# Patient Record
Sex: FEMALE | Race: WHITE | NOT HISPANIC OR LATINO | Employment: FULL TIME | URBAN - NONMETROPOLITAN AREA
[De-identification: names, ages, dates, MRNs, and addresses within clinical notes are randomized per-mention and may not be internally consistent; named-entity substitution may affect disease eponyms.]

---

## 2023-03-07 ENCOUNTER — TELEPHONE (OUTPATIENT)
Dept: PRIMARY CARE | Facility: CLINIC | Age: 48
End: 2023-03-07

## 2023-03-07 ENCOUNTER — OFFICE VISIT (OUTPATIENT)
Dept: PRIMARY CARE | Facility: CLINIC | Age: 48
End: 2023-03-07
Payer: COMMERCIAL

## 2023-03-07 VITALS
SYSTOLIC BLOOD PRESSURE: 133 MMHG | DIASTOLIC BLOOD PRESSURE: 84 MMHG | HEART RATE: 78 BPM | TEMPERATURE: 98.1 F | BODY MASS INDEX: 33.62 KG/M2 | RESPIRATION RATE: 16 BRPM | OXYGEN SATURATION: 97 % | WEIGHT: 221.1 LBS

## 2023-03-07 DIAGNOSIS — H10.30 ACUTE CONJUNCTIVITIS, UNSPECIFIED ACUTE CONJUNCTIVITIS TYPE, UNSPECIFIED LATERALITY: Primary | ICD-10-CM

## 2023-03-07 DIAGNOSIS — Z00.00 HEALTHCARE MAINTENANCE: Primary | ICD-10-CM

## 2023-03-07 DIAGNOSIS — E03.9 HYPOTHYROIDISM, UNSPECIFIED TYPE: ICD-10-CM

## 2023-03-07 PROBLEM — R00.2 PALPITATIONS: Status: ACTIVE | Noted: 2023-03-07

## 2023-03-07 PROBLEM — I10 HTN (HYPERTENSION): Status: ACTIVE | Noted: 2023-03-07

## 2023-03-07 PROBLEM — E66.811 OBESITY (BMI 30.0-34.9): Status: ACTIVE | Noted: 2023-03-07

## 2023-03-07 PROBLEM — H10.9 CONJUNCTIVITIS: Status: ACTIVE | Noted: 2023-03-07

## 2023-03-07 PROBLEM — J18.9 PNEUMONIA: Status: ACTIVE | Noted: 2023-03-07

## 2023-03-07 PROBLEM — R51.9 HEADACHE: Status: ACTIVE | Noted: 2023-03-07

## 2023-03-07 PROBLEM — F32.A DEPRESSION: Status: ACTIVE | Noted: 2023-03-07

## 2023-03-07 PROBLEM — M54.16 ACUTE LUMBAR RADICULOPATHY: Status: ACTIVE | Noted: 2023-03-07

## 2023-03-07 PROBLEM — J06.9 PROTRACTED URI: Status: ACTIVE | Noted: 2023-03-07

## 2023-03-07 PROBLEM — R07.9 CHEST PAIN: Status: ACTIVE | Noted: 2023-03-07

## 2023-03-07 PROBLEM — R60.0 LEG EDEMA: Status: ACTIVE | Noted: 2023-03-07

## 2023-03-07 PROBLEM — E78.5 HYPERLIPIDEMIA: Status: ACTIVE | Noted: 2023-03-07

## 2023-03-07 PROBLEM — E66.9 OBESITY (BMI 30.0-34.9): Status: ACTIVE | Noted: 2023-03-07

## 2023-03-07 PROBLEM — E89.2 H/O PARATHYROIDECTOMY (CMS/HCC): Status: ACTIVE | Noted: 2023-03-07

## 2023-03-07 PROBLEM — M62.830 MUSCLE SPASM OF BACK: Status: ACTIVE | Noted: 2023-03-07

## 2023-03-07 PROCEDURE — 3075F SYST BP GE 130 - 139MM HG: CPT | Performed by: FAMILY MEDICINE

## 2023-03-07 PROCEDURE — 3079F DIAST BP 80-89 MM HG: CPT | Performed by: FAMILY MEDICINE

## 2023-03-07 PROCEDURE — 99212 OFFICE O/P EST SF 10 MIN: CPT | Performed by: FAMILY MEDICINE

## 2023-03-07 RX ORDER — ETODOLAC 400 MG/1
TABLET, FILM COATED ORAL
COMMUNITY
Start: 2022-07-22 | End: 2023-07-31 | Stop reason: WASHOUT

## 2023-03-07 RX ORDER — FLUTICASONE PROPIONATE 50 MCG
1 SPRAY, SUSPENSION (ML) NASAL DAILY
COMMUNITY
Start: 2022-03-17

## 2023-03-07 RX ORDER — LEVOTHYROXINE SODIUM 50 UG/1
50 TABLET ORAL DAILY
COMMUNITY
Start: 2018-07-29 | End: 2023-10-04 | Stop reason: SDUPTHER

## 2023-03-07 RX ORDER — ALBUTEROL SULFATE 90 UG/1
2 AEROSOL, METERED RESPIRATORY (INHALATION) EVERY 4 HOURS PRN
COMMUNITY
Start: 2022-09-21

## 2023-03-07 RX ORDER — CIPROFLOXACIN HYDROCHLORIDE 3 MG/ML
1 SOLUTION/ DROPS OPHTHALMIC
Qty: 6 ML | Refills: 0 | Status: SHIPPED | OUTPATIENT
Start: 2023-03-07 | End: 2023-03-17

## 2023-03-07 RX ORDER — BUPROPION HYDROCHLORIDE 150 MG/1
1 TABLET ORAL DAILY
COMMUNITY
Start: 2022-03-31

## 2023-03-07 NOTE — PROGRESS NOTES
Subjective   Patient ID: Arcelia Kaba is a 47 y.o. female who presents for No chief complaint on file..    HPI     What concern/ problem/pain/symptom  brings you here today?      how long has pt had sxs?      describe symptoms-      how often do symptoms occur?      has pt tried anything for current symptoms, including medications (OTC or prescription)  ?        what makes symptoms worse?      has pt been seen recently for this problem ( within past 2-3 weeks) ?    if yes- where?    by who?    what treatment was provided?      Review of Systems    Objective     Physical Exam  Vitals and nursing note reviewed.   Constitutional:       Appearance: Normal appearance.   HENT:      Head: Normocephalic and atraumatic.      Right Ear: External ear normal.      Left Ear: External ear normal.      Nose: Nose normal.   Eyes:      Conjunctiva/sclera: Conjunctivae normal.   Cardiovascular:      Rate and Rhythm: Normal rate and regular rhythm.      Heart sounds: Normal heart sounds.   Pulmonary:      Effort: Pulmonary effort is normal.      Breath sounds: Normal breath sounds.   Abdominal:      General: Bowel sounds are normal.   Musculoskeletal:      Cervical back: Neck supple.   Skin:     General: Skin is warm and dry.   Neurological:      General: No focal deficit present.      Mental Status: She is alert and oriented to person, place, and time.   Psychiatric:         Mood and Affect: Mood normal.           Provider Impressions:     The number and complexity of problems addressed is considered moderate.  The amount and/or complexity of data reviewed and analyzed is considered moderate. The risk of complications and/or morbidity/mortality of patient is considered moderate. Overall, this patient encounter is considered a moderate risk visit.    What are antibiotics?  Antibiotics are medicines that help people fight infections caused by bacteria. They work by killing bacteria that are in the body. These medicines come in many  different forms, including pills, ointments, and liquids that are given by injection.    Antibiotics can do a lot of good. For people with serious bacterial infections, antibiotics can save lives. But people use them far too often, even when they're not needed. This is causing a very serious problem called antibiotic resistance. Antibiotic resistance happens when bacteria that have been exposed to an antibiotic change so that the antibiotic can no longer kill them. In those bacteria, the antibiotic has no effect. Because of this problem, doctors are having a harder and harder time treating infections. Experts worry that there will soon be infections that don't respond to any antibiotics.    When are antibiotics helpful?  Antibiotics can help people fight off infections caused by bacteria. They do not work on infections caused by viruses.    Some common bacterial infections that are treated with antibiotics include:    Strep throat    Pneumonia (an infection of the lungs)    Bladder infections    Infections you catch through sex, such as gonorrhea and chlamydia    When are antibiotics NOT helpful?    Antibiotics do not work on infections caused by viruses.    Antibiotics are not helpful for the common cold, because the common cold is caused by a virus.    Antibiotics are not helpful for the flu, because the flu is caused by a virus. (People with the flu can be treated with medicines called antiviral medicines, which are different from antibiotics.)      Even though antibiotics don't work on infections caused by viruses, people sometimes believe that they do. That's because they took antibiotics for a viral infection before and then got better. The problem is that those people would have gotten better with or without an antibiotic. When they get better with the antibiotic, they think that's what cured them, when in reality the antibiotic had nothing to do with it.    What's the harm in taking antibiotics even if they  "might not help?  There are many reasons you should not take antibiotics unless you absolutely need them:    Antibiotics cause side effects, such as nausea, vomiting, and diarrhea. They can even make it more likely that you will get a different kind of infection, such as yeast infection (if you are a woman).    Allergies to antibiotics are common. You can develop an allergy to an antibiotic, even if you have not had a problem with it before. Some allergies are just unpleasant, causing rashes and itching. But some can be very serious and even life-threatening. It is better to avoid any risk of an allergy, if the antibiotic wouldn't help you anyway.    Overuse of antibiotics leads to antibiotic resistance. Using antibiotics when they are not needed gives bacteria a chance to change, so that the antibiotics cannot hurt them later on. People who have infections caused by antibiotic-resistant bacteria often have to be treated in the hospital with many different antibiotics. People can even die from these infections, because there is no antibiotic that will cure them.    When should I take antibiotics?  You should take antibiotics only when a doctor or nurse prescribes them to you. You should never take antibiotics prescribed to someone else, and you should not take antibiotics that were prescribed to you for a previous illness. When prescribing an antibiotic, doctors and nurses have to carefully pick the right antibiotic for a particular infection. Not all antibiotics work on all bacteria.    If you do have an infection caused by a bacteria, your doctor or nurse might want to find out what that bacteria is, and which antibiotics can kill it. They do this by taking a \"culture\" of the bacteria and growing it in the lab. But it's not possible to do a culture on someone who has already started an antibiotic. So if you start an antibiotic without input from a doctor or nurse it will be impossible to know if you have taken the " "right one.    What can I do to reduce antibiotic resistance?  Here are some things that can help:    Do not pressure your doctor or nurse for antibiotics when they do not think you need them.    If you are prescribed antibiotics, finish all of the medicine and take it exactly as directed. Never skip doses or stop taking the medicine without talking to your doctor or nurse.    Do not give antibiotics that were prescribed to you to anyone else.    What if my doctor prescribes an antibiotic that did not work for me before?  If an antibiotic did not work for you before, that does not mean it will never work for you. If you have used an antibiotic before and it did not work, tell your doctor. But keep in mind that the infection you had before might not have been caused by the same bacteria that you have now. The \"best\" antibiotic is the right one for the bacteria causing the infection, not for the person with the infection.    What if I am allergic to an antibiotic?  If you had a bad reaction to an antibiotic, tell your doctor or nurse about it. But do not assume you are allergic unless your doctor or nurse tells you that you are.    Many people who think they are allergic to an antibiotic are wrong. If you get nauseous after taking an antibiotic, that does not mean you are allergic to it. Nausea is a common side effect of many antibiotics. If you are a woman and you get a yeast infection after taking an antibiotic, that does not mean you are allergic to it. Yeast infections are a common side effect of antibiotics.    Symptoms of antibiotic allergy can be mild and include a flat rash and itching. They can also be more serious and include:    Hives - Hives are raised, red patches of skin that are usually very itchy (picture 1).    Lip or tongue swelling    Trouble swallowing or breathing    Serious allergy symptoms can start right after you start taking an antibiotic if you are very sensitive. Less serious symptoms, on " the other hand, often start a day or more later.    Almost all antibiotics may cause possible side effects of allergic reaction, nausea, vomiting, diarrhea- most worrisome caused by C. diff, and secondary yeast infection.        Assessment/Plan   Start      .inb

## 2023-03-07 NOTE — TELEPHONE ENCOUNTER
Patient has upcoming appointment and needs new blood work orders added including thyroid.     Does not need call when put in, [facility]

## 2023-03-07 NOTE — PROGRESS NOTES
Subjective   Patient ID: Arcelia Kaba is a 47 y.o. female who presents for Conjunctivitis.    Conjunctivitis      What concern/ problem/pain/symptom  brings you here today?    Pink eye, rash    how long has pt had sxs?    2-3 days     describe symptoms-    Itchy eyes, watery eyes, muscle spasms- left eye, swollen eyes, rash on her right cheek, forehead    how often do symptoms occur?    Constant     has pt tried anything for current symptoms, including medications (OTC or prescription)  ?      Cold compresses, otc eye drops, non drowsy allergy pill     what makes symptoms worse?    Heat     has pt been seen recently for this problem ( within past 2-3 weeks) ?  no    if yes- where?  N/a    by who?  N/a    what treatment was provided?    N/a     Review of Systems    Objective     Patient is alert and oriented x 3 , NAD  HEAD- normocephalic and atraumatic   EYES-conjunctiva- left- red and swollen   Conj-right-red and swollen   lids - normal  EARS/NOSE- normal external exam   NECK-supple,FROM  SKIN- no abnormal skin lesions noted  NEURO- no focal deficits  PSYCH- pleasant, normal judgement and insight        Provider Impressions:     The number and complexity of problems addressed is considered moderate.  The amount and/or complexity of data reviewed and analyzed is considered moderate. The risk of complications and/or morbidity/mortality of patient is considered moderate. Overall, this patient encounter is considered a moderate risk visit.    What are antibiotics?  Antibiotics are medicines that help people fight infections caused by bacteria. They work by killing bacteria that are in the body. These medicines come in many different forms, including pills, ointments, and liquids that are given by injection.    Antibiotics can do a lot of good. For people with serious bacterial infections, antibiotics can save lives. But people use them far too often, even when they're not needed. This is causing a very serious problem  called antibiotic resistance. Antibiotic resistance happens when bacteria that have been exposed to an antibiotic change so that the antibiotic can no longer kill them. In those bacteria, the antibiotic has no effect. Because of this problem, doctors are having a harder and harder time treating infections. Experts worry that there will soon be infections that don't respond to any antibiotics.    When are antibiotics helpful?  Antibiotics can help people fight off infections caused by bacteria. They do not work on infections caused by viruses.    Some common bacterial infections that are treated with antibiotics include:    Strep throat    Pneumonia (an infection of the lungs)    Bladder infections    Infections you catch through sex, such as gonorrhea and chlamydia    When are antibiotics NOT helpful?    Antibiotics do not work on infections caused by viruses.    Antibiotics are not helpful for the common cold, because the common cold is caused by a virus.    Antibiotics are not helpful for the flu, because the flu is caused by a virus. (People with the flu can be treated with medicines called antiviral medicines, which are different from antibiotics.)      Even though antibiotics don't work on infections caused by viruses, people sometimes believe that they do. That's because they took antibiotics for a viral infection before and then got better. The problem is that those people would have gotten better with or without an antibiotic. When they get better with the antibiotic, they think that's what cured them, when in reality the antibiotic had nothing to do with it.    What's the harm in taking antibiotics even if they might not help?  There are many reasons you should not take antibiotics unless you absolutely need them:    Antibiotics cause side effects, such as nausea, vomiting, and diarrhea. They can even make it more likely that you will get a different kind of infection, such as yeast infection (if you are a  "woman).    Allergies to antibiotics are common. You can develop an allergy to an antibiotic, even if you have not had a problem with it before. Some allergies are just unpleasant, causing rashes and itching. But some can be very serious and even life-threatening. It is better to avoid any risk of an allergy, if the antibiotic wouldn't help you anyway.    Overuse of antibiotics leads to antibiotic resistance. Using antibiotics when they are not needed gives bacteria a chance to change, so that the antibiotics cannot hurt them later on. People who have infections caused by antibiotic-resistant bacteria often have to be treated in the hospital with many different antibiotics. People can even die from these infections, because there is no antibiotic that will cure them.    When should I take antibiotics?  You should take antibiotics only when a doctor or nurse prescribes them to you. You should never take antibiotics prescribed to someone else, and you should not take antibiotics that were prescribed to you for a previous illness. When prescribing an antibiotic, doctors and nurses have to carefully pick the right antibiotic for a particular infection. Not all antibiotics work on all bacteria.    If you do have an infection caused by a bacteria, your doctor or nurse might want to find out what that bacteria is, and which antibiotics can kill it. They do this by taking a \"culture\" of the bacteria and growing it in the lab. But it's not possible to do a culture on someone who has already started an antibiotic. So if you start an antibiotic without input from a doctor or nurse it will be impossible to know if you have taken the right one.    What can I do to reduce antibiotic resistance?  Here are some things that can help:    Do not pressure your doctor or nurse for antibiotics when they do not think you need them.    If you are prescribed antibiotics, finish all of the medicine and take it exactly as directed. Never skip " "doses or stop taking the medicine without talking to your doctor or nurse.    Do not give antibiotics that were prescribed to you to anyone else.    What if my doctor prescribes an antibiotic that did not work for me before?  If an antibiotic did not work for you before, that does not mean it will never work for you. If you have used an antibiotic before and it did not work, tell your doctor. But keep in mind that the infection you had before might not have been caused by the same bacteria that you have now. The \"best\" antibiotic is the right one for the bacteria causing the infection, not for the person with the infection.    What if I am allergic to an antibiotic?  If you had a bad reaction to an antibiotic, tell your doctor or nurse about it. But do not assume you are allergic unless your doctor or nurse tells you that you are.    Many people who think they are allergic to an antibiotic are wrong. If you get nauseous after taking an antibiotic, that does not mean you are allergic to it. Nausea is a common side effect of many antibiotics. If you are a woman and you get a yeast infection after taking an antibiotic, that does not mean you are allergic to it. Yeast infections are a common side effect of antibiotics.    Symptoms of antibiotic allergy can be mild and include a flat rash and itching. They can also be more serious and include:    Hives - Hives are raised, red patches of skin that are usually very itchy (picture 1).    Lip or tongue swelling    Trouble swallowing or breathing    Serious allergy symptoms can start right after you start taking an antibiotic if you are very sensitive. Less serious symptoms, on the other hand, often start a day or more later.    Almost all antibiotics may cause possible side effects of allergic reaction, nausea, vomiting, diarrhea- most worrisome caused by C. diff, and secondary yeast infection.        Assessment/Plan       start eye drops today      remove contacts if you " wear them      wash pillowcases daily      wash hands often      avoid rubbing your eyes      call if symptoms worsen or if no better

## 2023-06-19 ENCOUNTER — APPOINTMENT (OUTPATIENT)
Dept: PRIMARY CARE | Facility: CLINIC | Age: 48
End: 2023-06-19

## 2023-07-31 ENCOUNTER — OFFICE VISIT (OUTPATIENT)
Dept: PRIMARY CARE | Facility: CLINIC | Age: 48
End: 2023-07-31
Payer: COMMERCIAL

## 2023-07-31 VITALS
HEART RATE: 69 BPM | OXYGEN SATURATION: 96 % | BODY MASS INDEX: 33.13 KG/M2 | DIASTOLIC BLOOD PRESSURE: 87 MMHG | RESPIRATION RATE: 16 BRPM | HEIGHT: 69 IN | SYSTOLIC BLOOD PRESSURE: 131 MMHG | TEMPERATURE: 97.6 F

## 2023-07-31 DIAGNOSIS — I10 HYPERTENSION, UNSPECIFIED TYPE: ICD-10-CM

## 2023-07-31 DIAGNOSIS — Z12.31 ENCOUNTER FOR SCREENING MAMMOGRAM FOR MALIGNANT NEOPLASM OF BREAST: ICD-10-CM

## 2023-07-31 DIAGNOSIS — E66.9 CLASS 1 OBESITY WITH BODY MASS INDEX (BMI) OF 33.0 TO 33.9 IN ADULT, UNSPECIFIED OBESITY TYPE, UNSPECIFIED WHETHER SERIOUS COMORBIDITY PRESENT: ICD-10-CM

## 2023-07-31 DIAGNOSIS — F32.A DEPRESSION, UNSPECIFIED DEPRESSION TYPE: ICD-10-CM

## 2023-07-31 DIAGNOSIS — R53.83 MALAISE AND FATIGUE: ICD-10-CM

## 2023-07-31 DIAGNOSIS — R23.2 FLUSHING: ICD-10-CM

## 2023-07-31 DIAGNOSIS — E03.9 HYPOTHYROIDISM, UNSPECIFIED TYPE: ICD-10-CM

## 2023-07-31 DIAGNOSIS — Z00.00 HEALTHCARE MAINTENANCE: ICD-10-CM

## 2023-07-31 DIAGNOSIS — E89.2 H/O PARATHYROIDECTOMY (CMS/HCC): ICD-10-CM

## 2023-07-31 DIAGNOSIS — J30.9 ALLERGIC RHINITIS, UNSPECIFIED SEASONALITY, UNSPECIFIED TRIGGER: ICD-10-CM

## 2023-07-31 DIAGNOSIS — E78.5 HYPERLIPIDEMIA, UNSPECIFIED HYPERLIPIDEMIA TYPE: ICD-10-CM

## 2023-07-31 DIAGNOSIS — R53.81 MALAISE AND FATIGUE: ICD-10-CM

## 2023-07-31 DIAGNOSIS — M25.40 JOINT SWELLING: Primary | ICD-10-CM

## 2023-07-31 DIAGNOSIS — Z12.11 COLON CANCER SCREENING: ICD-10-CM

## 2023-07-31 PROBLEM — H10.9 CONJUNCTIVITIS: Status: RESOLVED | Noted: 2023-03-07 | Resolved: 2023-07-31

## 2023-07-31 PROBLEM — R07.9 CHEST PAIN: Status: RESOLVED | Noted: 2023-03-07 | Resolved: 2023-07-31

## 2023-07-31 PROBLEM — J18.9 PNEUMONIA: Status: RESOLVED | Noted: 2023-03-07 | Resolved: 2023-07-31

## 2023-07-31 PROBLEM — R51.9 HEADACHE: Status: RESOLVED | Noted: 2023-03-07 | Resolved: 2023-07-31

## 2023-07-31 PROBLEM — J06.9 PROTRACTED URI: Status: RESOLVED | Noted: 2023-03-07 | Resolved: 2023-07-31

## 2023-07-31 PROCEDURE — 3008F BODY MASS INDEX DOCD: CPT | Performed by: FAMILY MEDICINE

## 2023-07-31 PROCEDURE — 3079F DIAST BP 80-89 MM HG: CPT | Performed by: FAMILY MEDICINE

## 2023-07-31 PROCEDURE — 3075F SYST BP GE 130 - 139MM HG: CPT | Performed by: FAMILY MEDICINE

## 2023-07-31 PROCEDURE — 1036F TOBACCO NON-USER: CPT | Performed by: FAMILY MEDICINE

## 2023-07-31 PROCEDURE — 99396 PREV VISIT EST AGE 40-64: CPT | Performed by: FAMILY MEDICINE

## 2023-07-31 PROCEDURE — 99214 OFFICE O/P EST MOD 30 MIN: CPT | Performed by: FAMILY MEDICINE

## 2023-07-31 NOTE — ASSESSMENT & PLAN NOTE
If secretions are THICK:  Drink at least 6-8 glasses of water daily to thin secretions.  Mucinex can be used if secretions remain thick.    If secretions are THIN, watery, and abundant:  Antihistamine such as loratadine (claritin) can help dry up a drippy nose.      Supplementation of 75 mg of zinc at first sign of cold-like symptoms has been shown to improve symptoms.     A teaspoon of honey every 4 hours as needed for cough has been shown to reduce cough as well or better than over-the-counter cough suppressants. Cough drops can also be helpful.     Gargling with warm salt water can help clear post nasal drip and soothe a sore throat.    Throat lozenges can also be helpful.      Fever or aches can be helped by taking acetaminophen (Tylenol) every four hours as needed, or ibuprofen (Motrin, Advil) or naproxen (Aleve) as directed if you are able.

## 2023-07-31 NOTE — ASSESSMENT & PLAN NOTE
Lifestyle managed  FMHx of CAD on father's side.  3/2022 TC/HDL ratio 7.1 (HDL low, TRIG in 800s).  Goal TC/HDL ratio 3.4 or less, LDL 99 or less,  or less, and TRIG 150 or less.   Repeat lipid panel ordered today.  Will further address at next OV.

## 2023-07-31 NOTE — ASSESSMENT & PLAN NOTE
BP is 131/87 in office today. Mildly elevated.  Goal BP is 130/80 or less, ideally 120/80 or less.   Will further address at next OV  Diet and exercise recommendations revisited.

## 2023-07-31 NOTE — ASSESSMENT & PLAN NOTE
Vaccines and screenings reviewed.  Questionnaires completed.  Health and wellness topics reviewed.  Diet and exercise recommendations revisited.  Routine blood work ordered today.     TDAP booster recommended, can get done at pharmacy.    Screening mammogram ordered today.  GI referral for screening colonoscopy ordered today.    LIFESTYLE MEASURES  -make sure you are avoiding refined carbs such as breads, pasta, cereal, candy, soda,  nutrition bars, granola, chips, and sugar sweetened beverages.      -eat 5- 7 servings daily of veggies,  healthy protein such as chicken, fish,  beans, and eggs, and include healthy fats in your diet such as seeds, nuts, olive oil, avocados, and salmon.   -exercise 4 - 6 days per week as you are able, 150 minutes total weekly divided up is recommended.  -Vitamin D is recommended at 1000 - 5000 IU international units daily.   -Always wear sunscreen when you have sun exposure.  -64 oz of water is recommended daily.  -Dental visits recommended every 6 months.  -Eye exam recommended every 2 years, for those with vision problems every year.

## 2023-07-31 NOTE — ASSESSMENT & PLAN NOTE
FMHx of significant thyroid disease (multiple family members with Hashimoto's, Graves)  CURRENT DOSE: Levothyroxine 50 mcg daily  3/2022 TSH normal, repeat ordered today.  Will address dose changes if needed at follow-up.

## 2023-07-31 NOTE — PROGRESS NOTES
"Subjective   Patient ID: Arcelia Kaba is a 48 y.o. female who presents for Annual Exam (Pt is here for annual physical exam- ABN given to pt and signed, pt verbalized understanding. //Pt is also here for a 6 month follow up- Pt would like to discuss her thyroid medication- states that she thinks that she may be pre menopausal or the med is not working).    HPI     WELLNESS VISIT:    TDAP: none found  SHINGRIX: N/A  PNEUMOVAX: N/A  PAP: s/p partial hysterectomy related to menorrhea/no malignancy  MAMMO: none found  CSCOPE: none found  DEXA: N/A  HEP C SCREEN: none found  CACS: none found  LIPID: 3/2022 TC/HDL ratio 7.1, HDL 23.6, TRIG 829    She takes she feels she is going into menopause or that her thyroid medication may not be working properly. Reports fatigue and just not feeling normal. Joint swelling in knees, ankles, and feet. Notices the ankle and feet swelling. Reports flushing, has had partial hysterectomy so no periods. Notes cervical lymphadenopathy x 6 months, states intermittent about every other day. No other swollen glands in arms, legs, groin, etc. Does not feel \"unwell\", just tired. Feels she gets sick often, has kids, youngest aged 4, at home. She gets sick every 60 days, her kids are not sick now and she feels unwell.    Other than thyroid disease, no family history of autoimmune diseases.    Dental visits up to date.    Breast cancer screening: Denies family history.   Denies lumps/bumps, skin changes, nipple retraction, or nipple drainage.    Cervical cancer screening: s/p partial hysterectomy related to menorrhea/no malignancy  Denies family history.   Denies pelvic pain, vaginal discharge, or vaginal bleeding.    Colon cancer screening: Denies family history.   Reports constipation, no new, working on increasing veggies.  Denies melena, hematochezia, constipation, bloating, change in bowel habits.    ROUTINE VISIT:  CHRONIC CONDITIONS:   -ELEVATED LFTs  3/2022 CMP with elevated AST (66), mild " "but new for patient.  Remaining CMP WNL.  Repeat blood work ordered previously, not completed at this time.     -HTN  Started on HCTZ 6/2022, no longer on med list 7/2023  Unable to tolerate the Amlodipine 2.5 mg due to edema.    Patient is taking blood pressure outside of office and reports good control.  Patient denies chest pain, shortness of breath with exertion, palpitations, lower extremity edema, headache, or dizziness.      -MOOD  Taking Bupropion 300 mg daily  Venlafaxine added on 2/2022, however stopped in 3/2022 due to possible side effects.     -HYPOTHYROIDISM  FMHx of significant thyroid disease (multiple family members with Hashimoto's, Graves)  CURRENT DOSE: Levothyroxine 50 mcg daily  3/2022 TSH normal.    Medication is being taken as directed and is well-tolerated.   Patient denies heat or cold intolerance, diarrhea or constipation, coarsening of hair, and palpitations.     -HLD  Lifestyle managed  FMHx of CAD on father's side.  3/2022 TC/HDL ratio 7.1 (HDL low, TRIG in 800s).  No CACS in our records.    Patient denies any facial droop, sudden vision loss, weakness or numbness on one side of body.   Patient denies chest pain, shortness of breath with exertion, palpitations, or symptoms of claudication.     -HX of PRIMARY HYPERPARATHYROIDISM  Episode of severe hypercalcemia with Ca >14 and PTH level >400.   She was treated with cellmate and her calcium levels normalized down to 11.4.   The effective the Zometa should last about 3-4 weeks.   Sestamibi scan US performed by Dr. Daly highly consistent with a right inferior parathyroid gland.  S/p parathyroidectomy February 2021, with intraoperative PTH levels normalizing.    Review of Systems   All other systems reviewed and are negative.      Objective   /87 (BP Location: Right arm, Patient Position: Sitting, BP Cuff Size: Large adult)   Pulse 69   Temp 36.4 °C (97.6 °F) (Temporal)   Resp 16   Ht 1.74 m (5' 8.5\")   SpO2 96%   BMI 33.13 " kg/m²     Physical Exam  Vitals and nursing note reviewed.   Constitutional:       General: She is not in acute distress.     Appearance: Normal appearance. She is not toxic-appearing.   HENT:      Head: Normocephalic and atraumatic.      Right Ear: Tympanic membrane normal.      Left Ear: Tympanic membrane normal.      Nose: Rhinorrhea present.      Right Turbinates: Swollen.      Left Turbinates: Swollen.      Mouth/Throat:      Lips: Pink.   Eyes:      Extraocular Movements: Extraocular movements intact.      Pupils: Pupils are equal, round, and reactive to light.   Neck:      Thyroid: No thyromegaly.      Vascular: No hepatojugular reflux or JVD.   Cardiovascular:      Rate and Rhythm: Normal rate and regular rhythm.      Heart sounds: No murmur heard.     No friction rub. No gallop.   Pulmonary:      Effort: Pulmonary effort is normal.      Breath sounds: Normal breath sounds. No wheezing, rhonchi or rales.   Abdominal:      General: Bowel sounds are normal. There is no distension.      Palpations: Abdomen is soft. There is no mass.      Tenderness: There is no abdominal tenderness. There is no guarding.   Musculoskeletal:         General: Normal range of motion.      Comments: Mild edema dorsum bilateral feet, left greater than R  Mild erythema across mid food, left.  Scales and plaque soles of R foot.  No pitting edema.   Lymphadenopathy:      Cervical: No cervical adenopathy.   Skin:     General: Skin is warm and dry.   Neurological:      General: No focal deficit present.      Mental Status: She is alert and oriented to person, place, and time.      Gait: Gait normal.   Psychiatric:         Mood and Affect: Mood normal.         Behavior: Behavior normal.         Assessment/Plan   Problem List Items Addressed This Visit       Depression     Continue Bupropion 300 mg daily.  Can further discuss at next OV if needed.         H/O parathyroidectomy (CMS/[facility])     S/p parathyroidectomy February 2021, with  intraoperative PTH levels normalizing.         HTN (hypertension)     BP is 131/87 in office today. Mildly elevated.  Goal BP is 130/80 or less, ideally 120/80 or less.   Will further address at next OV  Diet and exercise recommendations revisited.         Hyperlipidemia     Lifestyle managed  FMHx of CAD on father's side.  3/2022 TC/HDL ratio 7.1 (HDL low, TRIG in 800s).  Goal TC/HDL ratio 3.4 or less, LDL 99 or less,  or less, and TRIG 150 or less.   Repeat lipid panel ordered today.  Will further address at next OV.         Hypothyroidism     FMHx of significant thyroid disease (multiple family members with Hashimoto's, Graves)  CURRENT DOSE: Levothyroxine 50 mcg daily  3/2022 TSH normal, repeat ordered today.  Will address dose changes if needed at follow-up.         Relevant Orders    TSH with reflex to Free T4 if abnormal    Thyrotropin Receptor Antibody    Thyroid Peroxidase (TPO) Antibody    Anti-Thyroglobulin Antibody    Triiodothyronine, Free    Healthcare maintenance     Vaccines and screenings reviewed.  Questionnaires completed.  Health and wellness topics reviewed.  Diet and exercise recommendations revisited.  Routine blood work ordered today.     TDAP booster recommended, can get done at pharmacy.    Screening mammogram ordered today.  GI referral for screening colonoscopy ordered today.    LIFESTYLE MEASURES  -make sure you are avoiding refined carbs such as breads, pasta, cereal, candy, soda,  nutrition bars, granola, chips, and sugar sweetened beverages.      -eat 5- 7 servings daily of veggies,  healthy protein such as chicken, fish,  beans, and eggs, and include healthy fats in your diet such as seeds, nuts, olive oil, avocados, and salmon.   -exercise 4 - 6 days per week as you are able, 150 minutes total weekly divided up is recommended.  -Vitamin D is recommended at 1000 - 5000 IU international units daily.   -Always wear sunscreen when you have sun exposure.  -64 oz of water is  recommended daily.  -Dental visits recommended every 6 months.  -Eye exam recommended every 2 years, for those with vision problems every year.           Relevant Orders    CBC    Comprehensive Metabolic Panel    Lipid Panel    Allergic rhinitis     If secretions are THICK:  Drink at least 6-8 glasses of water daily to thin secretions.  Mucinex can be used if secretions remain thick.    If secretions are THIN, watery, and abundant:  Antihistamine such as loratadine (claritin) can help dry up a drippy nose.      Supplementation of 75 mg of zinc at first sign of cold-like symptoms has been shown to improve symptoms.     A teaspoon of honey every 4 hours as needed for cough has been shown to reduce cough as well or better than over-the-counter cough suppressants. Cough drops can also be helpful.     Gargling with warm salt water can help clear post nasal drip and soothe a sore throat.    Throat lozenges can also be helpful.      Fever or aches can be helped by taking acetaminophen (Tylenol) every four hours as needed, or ibuprofen (Motrin, Advil) or naproxen (Aleve) as directed if you are able.           Other Visit Diagnoses       Joint swelling    -  Primary    Relevant Orders    LYNDA with Reflex to LU    C-Reactive Protein    Sedimentation Rate    Citrulline Antibody, IgG    Creatine Kinase    Rheumatoid Factor    Class 1 obesity with body mass index (BMI) of 33.0 to 33.9 in adult, unspecified obesity type, unspecified whether serious comorbidity present        Malaise and fatigue        Relevant Orders    LYNDA with Reflex to LU    C-Reactive Protein    Sedimentation Rate    Citrulline Antibody, IgG    Creatine Kinase    Rheumatoid Factor    FSH & LH    Flushing        Relevant Orders    FSH & LH    Encounter for screening mammogram for malignant neoplasm of breast        Relevant Orders    BI mammo bilateral screening tomosynthesis    Colon cancer screening        Relevant Orders    Referral to Gastroenterology             Follow-up in 4-8 weeks for recheck above/lab review.  Call for sooner follow-up if needed.     Time Spent  Prep time on day of patient encounter: 5 minutes  Time spent directly with patient, family or caregiver: 30 minutes  Additional Time Spent on Patient Care Activities: 0 minutes  Documentation Time: 7 minutes  Other Time Spent: 0 minutes  Total: 42 minutes      Scribe Attestation  By signing my name below, IShameka, Angel   attest that this documentation has been prepared under the direction and in the presence of Donna Mello DO.

## 2023-10-04 ENCOUNTER — TELEMEDICINE (OUTPATIENT)
Dept: PRIMARY CARE | Facility: CLINIC | Age: 48
End: 2023-10-04
Payer: COMMERCIAL

## 2023-10-04 DIAGNOSIS — R53.81 MALAISE AND FATIGUE: ICD-10-CM

## 2023-10-04 DIAGNOSIS — R53.83 MALAISE AND FATIGUE: ICD-10-CM

## 2023-10-04 DIAGNOSIS — R23.2 FLUSHING: ICD-10-CM

## 2023-10-04 DIAGNOSIS — E03.9 HYPOTHYROIDISM, UNSPECIFIED TYPE: Primary | ICD-10-CM

## 2023-10-04 DIAGNOSIS — M25.40 JOINT SWELLING: ICD-10-CM

## 2023-10-04 PROCEDURE — 99213 OFFICE O/P EST LOW 20 MIN: CPT | Performed by: FAMILY MEDICINE

## 2023-10-04 RX ORDER — LEVOTHYROXINE SODIUM 50 UG/1
50 TABLET ORAL DAILY
Qty: 90 TABLET | Refills: 1 | Status: SHIPPED | OUTPATIENT
Start: 2023-10-04 | End: 2024-01-11

## 2023-10-04 NOTE — PROGRESS NOTES
Virtual or Telephone Consent    An interactive audio and video telecommunication system which permits real time communications between the patient (at the originating site) and provider (at the distant site) was utilized to provide this telehealth service.   Verbal consent was requested and obtained from Arcelia Kaba on this date, 10/04/23 for a telehealth visit.     Subjective   Patient ID: Arcelia Kaba is a 48 y.o. female who presents for No chief complaint on file..    HPI     VIRTUAL VISIT    Patient here for recheck and lab review.    Patient seen on 7/31/2023 for CPE, she also endorsed some joint pain/swelling, malaise, fatigue, and flushing so autoimmune and hormone lab work-up was initiated.    Labs not done prior to visit today.  Was previously on her 's insurance but they suddenly closed and this insurance was cancelled.  They had to wait to get on insurance through her workplace which has since been confirmed.    States she would like to talk about her thyroid dosing.    FMHx of significant thyroid disease (multiple family members with Hashimoto's, Graves)  CURRENT DOSE: Levothyroxine 50 mcg daily  3/2022 TSH normal.    States feels same at last visit when labs were ordered.  + fatigue, + feeling unwell, + edema  + joint swelling in knees, ankles, and feet.   + flushing, has had partial hysterectomy (around 2014) so no periods.  She had really bad periods prior to the partial hysterectomy.    Drinking lots of water.  Trying not to drink caffeine, she is doing mushroom coffee.  She is taking magnesium and vitamin D supplements for sleep.      Review of Systems   All other systems reviewed and are negative.    Objective     Physical Exam  Nursing note reviewed.       Visit conducted via telehealth in light of COVID-19 pandemic.    Video used     Gen: patient is alert and oriented x 3  pleasant, and in no apparent distress.  Patient appears well, no cough, no dyspnea/tachypnea observed on video.    Psychiatric: Patient has good eye contact. Mood and affect are appropriate.     Assessment/Plan   Diagnoses and all orders for this visit:  Hypothyroidism, unspecified type  Joint swelling  Malaise and fatigue  Flushing    Continue with Synthroid as presently subscribed.  Will notify if changes in dose is indicated once labs are completed.    Autoimmune/hormone work-up previously ordered, patient had some insurance issues in interim but these are resolved and she will complete these labs fasting first thing in the morning at her convenience as the orders are still good.    Lifestyle measures reviewed today:  -consider doing trial of elimination diet, most commonly people eliminate gluten, diary, and sugars. I recommend doing one at the time to minimize variables.  -make sure you are avoiding refined carbs such as breads, pasta, cereal, candy, soda,  nutrition bars, granola, chips, and sugar sweetened beverages.      -eat 5- 7 servings daily of veggies,  healthy protein such as chicken, fish,  beans, and eggs, and include healthy fats in your diet such as seeds, nuts, olive oil, avocados, and salmon.   -exercise 4 - 6 days per week as you are able, 150 minutes total weekly divided up is recommended.  -Vitamin D is recommended at 1000 - 5000 IU international units daily.    We briefly discussed functional medicine evaluation today, can further address upon labs resulting/follow-up.    Patient to complete labs then will come back for recheck above + lab review.  Call for sooner follow-up if needed.     Scribe Attestation  By signing my name below, IShameka Scribe   attest that this documentation has been prepared under the direction and in the presence of Donna Mello DO.

## 2023-10-30 ENCOUNTER — OFFICE VISIT (OUTPATIENT)
Dept: PRIMARY CARE | Facility: CLINIC | Age: 48
End: 2023-10-30
Payer: COMMERCIAL

## 2023-10-30 VITALS
OXYGEN SATURATION: 97 % | SYSTOLIC BLOOD PRESSURE: 146 MMHG | BODY MASS INDEX: 34.73 KG/M2 | HEART RATE: 87 BPM | TEMPERATURE: 97.6 F | DIASTOLIC BLOOD PRESSURE: 91 MMHG | WEIGHT: 231.8 LBS

## 2023-10-30 DIAGNOSIS — B34.9 VIRAL SYNDROME: Primary | ICD-10-CM

## 2023-10-30 DIAGNOSIS — J02.9 PHARYNGITIS, UNSPECIFIED ETIOLOGY: ICD-10-CM

## 2023-10-30 PROCEDURE — 3008F BODY MASS INDEX DOCD: CPT | Performed by: FAMILY MEDICINE

## 2023-10-30 PROCEDURE — 3077F SYST BP >= 140 MM HG: CPT | Performed by: FAMILY MEDICINE

## 2023-10-30 PROCEDURE — 99213 OFFICE O/P EST LOW 20 MIN: CPT | Performed by: FAMILY MEDICINE

## 2023-10-30 PROCEDURE — 1036F TOBACCO NON-USER: CPT | Performed by: FAMILY MEDICINE

## 2023-10-30 PROCEDURE — 3080F DIAST BP >= 90 MM HG: CPT | Performed by: FAMILY MEDICINE

## 2023-10-30 RX ORDER — PREDNISONE 20 MG/1
TABLET ORAL
Qty: 4 TABLET | Refills: 0 | Status: SHIPPED | OUTPATIENT
Start: 2023-10-30 | End: 2024-01-11 | Stop reason: WASHOUT

## 2023-10-30 RX ORDER — PREDNISONE 20 MG/1
TABLET ORAL
Qty: 4 TABLET | Refills: 0 | Status: SHIPPED | OUTPATIENT
Start: 2023-10-30 | End: 2023-10-30 | Stop reason: SDUPTHER

## 2023-10-30 NOTE — PROGRESS NOTES
Subjective   Patient ID: Arcelia Kaba is a 48 y.o. female who presents for Sinusitis (Pt presents for possible sinus infection- sore throat, fever, chills, body aches, hurts to talk,  hurts to swallow, started in her ears, swollen glands, this has been going on for about 4 days, states that 600mg ibuprofen is the only thing that has worked to help with her pain ).    HPI     Patient here for evaluation of upper respiratory symptoms.  Patient reports symptoms ongoing for about 4-5 days now and progressing.    She states it started in her ears, was also having some sneezing, fatigue and swollen glands at that time.  She is now having sore throat that is painful to phonate and swallow.  She continues with cervical lymphadenopathy, states largest she ever felt.   She also endorses fevers, chills, and myalgias.    No colored secretions.  No cough or wheezing.  No SOB or difficulty handling secretions.  No N/V/D or abdominal pain.    No prior history of mono.    No prior covid or flu testing.    Home treatments include:  -Ibuprofen 600 mg, states the only thing that has helped with the pain, can hardly talk without it.  -She also used iced roller helped some but not relieving.       Review of Systems   All other systems reviewed and are negative.      Objective   BP (!) 159/96 (BP Location: Right arm, Patient Position: Sitting, BP Cuff Size: Large adult)   Pulse 87   Temp 36.4 °C (97.6 °F) (Temporal)   Wt 105 kg (231 lb 12.8 oz)   SpO2 97%   BMI 34.73 kg/m²     Physical Exam  Vitals and nursing note reviewed.   Constitutional:       General: She is not in acute distress.     Appearance: Normal appearance. She is not toxic-appearing.   HENT:      Head: Normocephalic and atraumatic.      Right Ear: A middle ear effusion (clear) is present.      Left Ear: A middle ear effusion (clear) is present. Tympanic membrane is retracted.      Nose: Rhinorrhea (clear) present.      Right Turbinates: Swollen (mildly erythematous).       Left Turbinates: Swollen (mildly erythematous).      Mouth/Throat:      Lips: Pink.      Pharynx: Pharyngeal swelling and posterior oropharyngeal erythema present. No oropharyngeal exudate.      Tonsils: No tonsillar exudate.      Comments:   Throat swollen but not obstructed, no difficulty handling secretions.  Cardiovascular:      Rate and Rhythm: Normal rate and regular rhythm.      Heart sounds: No murmur heard.     No friction rub. No gallop.   Pulmonary:      Effort: Pulmonary effort is normal.      Breath sounds: Normal breath sounds. No wheezing, rhonchi or rales.   Abdominal:      Palpations: There is no hepatomegaly, splenomegaly or mass.      Tenderness: There is no abdominal tenderness. There is no guarding or rebound.   Neurological:      General: No focal deficit present.      Mental Status: She is alert and oriented to person, place, and time.   Psychiatric:         Mood and Affect: Mood normal.         Behavior: Behavior normal.         Assessment/Plan   Diagnoses and all orders for this visit:  Viral syndrome  Pharyngitis, unspecified etiology  -     predniSONE (Deltasone) 20 mg tablet; 2 tabs daily x 2 days for sore throat; difficulty swallowing  - do not take within 24 hours of ibuprofen/naproxen    You have been diagnosed with URI (viral) vs other viral infection therefore treatment is management of symptoms. Treatment with an antibiotic for typical URI does not help, and can cause harm from side effects of medications and bacterial resistance. Will treat symptomatically as noted below.    Flu and covid testing deferred, would not impact treatment as symptoms ongoing for 4-5 days.    Due to degree of inflammation/pharyngitis, a prescription for short course high dose of prednisone has been printed in case needed. If your inflammation/symptoms have not improved with after a few days, you may take the prednisone as directed. Side effects can include insomnia, irritability, hunger, weight gain,  flushing, elevated blood sugars, suppressed immune system. DO NOT USE THE PREDNISONE UNLESS NECESSARY, do not take within 24 hours of last NSAID use.    If secretions are THICK:  Drink at least 6-8 glasses of water daily to thin secretions.  Mucinex can be used if secretions remain thick.    If secretions are THIN, watery, and abundant:  Antihistamine such as loratadine (claritin) can help dry up a drippy nose.      Supplementation of 75 mg of zinc at first sign of cold-like symptoms has been shown to improve symptoms.     A teaspoon of honey every 4 hours as needed for cough has been shown to reduce cough as well or better than over-the-counter cough suppressants. Cough drops can also be helpful.     Gargling with warm salt water can help clear post nasal drip and soothe a sore throat.    Throat lozenges can also be helpful.      Fever or aches can be helped by taking acetaminophen (Tylenol) every four hours as needed, or ibuprofen (Motrin, Advil) or naproxen (Aleve) as directed if you are able.    Other options to help open up nasal passages and Eustachian tubes can include non-medicine intervention such as steam, essential oils such as Eucalyptus, peppermint, rosemary added to a diffuser or humidifier.    Mobilizing fluid and helping with congestion through repetitive exercise such as rebounding on a mini- trampoline, jumping jacks, or running may all help.     The Deb technique can be used, which is a Osteopathic manual medicine technique to help with ear fullness.   Perform by pulling up and back on your ear- use a firm steady force and pull your ear up and back, until you feel a deep tug at the base of your ear. At the same time, use your other hand to pull the angle of your jaw (the same side of the face as the ear you are tugging) in the opposite direction - pull and massage the jaw area forward and down, away from your ear, thus stretching out the deep tissues that surround the Eustachian tube.          If you should develop a fever and worsening cough or nasal secretions with consistent yellow or green phlegm, please call for next steps/antibiotic if indicated.        Follow-up with me as previously scheduled for routine care.  Call for sooner follow-up if needed.     Scribe Attestation  By signing my name below, Shameka CHANEL Scribe   attest that this documentation has been prepared under the direction and in the presence of Donna Mello DO.

## 2024-01-05 ENCOUNTER — LAB (OUTPATIENT)
Dept: LAB | Facility: LAB | Age: 49
End: 2024-01-05
Payer: COMMERCIAL

## 2024-01-05 DIAGNOSIS — R53.81 MALAISE AND FATIGUE: ICD-10-CM

## 2024-01-05 DIAGNOSIS — M25.40 JOINT SWELLING: ICD-10-CM

## 2024-01-05 DIAGNOSIS — E03.9 HYPOTHYROIDISM, UNSPECIFIED TYPE: ICD-10-CM

## 2024-01-05 DIAGNOSIS — Z00.00 HEALTHCARE MAINTENANCE: ICD-10-CM

## 2024-01-05 DIAGNOSIS — R23.2 FLUSHING: ICD-10-CM

## 2024-01-05 DIAGNOSIS — R53.83 MALAISE AND FATIGUE: ICD-10-CM

## 2024-01-05 PROCEDURE — 85027 COMPLETE CBC AUTOMATED: CPT

## 2024-01-05 PROCEDURE — 84481 FREE ASSAY (FT-3): CPT

## 2024-01-05 PROCEDURE — 86376 MICROSOMAL ANTIBODY EACH: CPT

## 2024-01-05 PROCEDURE — 85652 RBC SED RATE AUTOMATED: CPT

## 2024-01-05 PROCEDURE — 86800 THYROGLOBULIN ANTIBODY: CPT

## 2024-01-05 PROCEDURE — 84443 ASSAY THYROID STIM HORMONE: CPT

## 2024-01-05 PROCEDURE — 82550 ASSAY OF CK (CPK): CPT

## 2024-01-05 PROCEDURE — 83001 ASSAY OF GONADOTROPIN (FSH): CPT

## 2024-01-05 PROCEDURE — 86140 C-REACTIVE PROTEIN: CPT

## 2024-01-05 PROCEDURE — 86200 CCP ANTIBODY: CPT

## 2024-01-05 PROCEDURE — 83519 RIA NONANTIBODY: CPT

## 2024-01-05 PROCEDURE — 86235 NUCLEAR ANTIGEN ANTIBODY: CPT

## 2024-01-05 PROCEDURE — 80061 LIPID PANEL: CPT

## 2024-01-05 PROCEDURE — 83002 ASSAY OF GONADOTROPIN (LH): CPT

## 2024-01-05 PROCEDURE — 86038 ANTINUCLEAR ANTIBODIES: CPT

## 2024-01-05 PROCEDURE — 36415 COLL VENOUS BLD VENIPUNCTURE: CPT

## 2024-01-05 PROCEDURE — 86225 DNA ANTIBODY NATIVE: CPT

## 2024-01-05 PROCEDURE — 86431 RHEUMATOID FACTOR QUANT: CPT

## 2024-01-05 PROCEDURE — 80053 COMPREHEN METABOLIC PANEL: CPT

## 2024-01-06 LAB
ALBUMIN SERPL BCP-MCNC: 4.3 G/DL (ref 3.4–5)
ALP SERPL-CCNC: 46 U/L (ref 33–110)
ALT SERPL W P-5'-P-CCNC: 39 U/L (ref 7–45)
ANION GAP SERPL CALC-SCNC: 11 MMOL/L (ref 10–20)
AST SERPL W P-5'-P-CCNC: 26 U/L (ref 9–39)
BILIRUB SERPL-MCNC: 1.7 MG/DL (ref 0–1.2)
BUN SERPL-MCNC: 13 MG/DL (ref 6–23)
CALCIUM SERPL-MCNC: 9.2 MG/DL (ref 8.6–10.6)
CCP IGG SERPL-ACNC: <1 U/ML
CHLORIDE SERPL-SCNC: 102 MMOL/L (ref 98–107)
CHOLEST SERPL-MCNC: 207 MG/DL (ref 0–199)
CHOLESTEROL/HDL RATIO: 8.1
CK SERPL-CCNC: 89 U/L (ref 0–215)
CO2 SERPL-SCNC: 29 MMOL/L (ref 21–32)
CREAT SERPL-MCNC: 0.78 MG/DL (ref 0.5–1.05)
CRP SERPL-MCNC: 0.22 MG/DL
ERYTHROCYTE [DISTWIDTH] IN BLOOD BY AUTOMATED COUNT: 12.7 % (ref 11.5–14.5)
ERYTHROCYTE [SEDIMENTATION RATE] IN BLOOD BY WESTERGREN METHOD: 21 MM/H (ref 0–20)
FSH SERPL-ACNC: 20 IU/L
GFR SERPL CREATININE-BSD FRML MDRD: >90 ML/MIN/1.73M*2
GLUCOSE SERPL-MCNC: 108 MG/DL (ref 74–99)
HCT VFR BLD AUTO: 44.1 % (ref 36–46)
HDLC SERPL-MCNC: 25.4 MG/DL
HGB BLD-MCNC: 14.9 G/DL (ref 12–16)
LDLC SERPL CALC-MCNC: ABNORMAL MG/DL
LH SERPL-ACNC: 8.6 IU/L
MCH RBC QN AUTO: 30 PG (ref 26–34)
MCHC RBC AUTO-ENTMCNC: 33.8 G/DL (ref 32–36)
MCV RBC AUTO: 89 FL (ref 80–100)
NON HDL CHOLESTEROL: 182 MG/DL (ref 0–149)
NRBC BLD-RTO: 0 /100 WBCS (ref 0–0)
PLATELET # BLD AUTO: 251 X10*3/UL (ref 150–450)
POTASSIUM SERPL-SCNC: 4 MMOL/L (ref 3.5–5.3)
PROT SERPL-MCNC: 7.4 G/DL (ref 6.4–8.2)
RBC # BLD AUTO: 4.97 X10*6/UL (ref 4–5.2)
RHEUMATOID FACT SER NEPH-ACNC: <10 IU/ML (ref 0–15)
SODIUM SERPL-SCNC: 138 MMOL/L (ref 136–145)
T3FREE SERPL-MCNC: 3.6 PG/ML (ref 2.3–4.2)
THYROPEROXIDASE AB SERPL-ACNC: <28 IU/ML
TRIGL SERPL-MCNC: 811 MG/DL (ref 0–149)
TSH SERPL-ACNC: 1.51 MIU/L (ref 0.44–3.98)
VLDL: ABNORMAL
WBC # BLD AUTO: 6.2 X10*3/UL (ref 4.4–11.3)

## 2024-01-07 DIAGNOSIS — R76.8 DS DNA ANTIBODY POSITIVE: Primary | ICD-10-CM

## 2024-01-07 DIAGNOSIS — R53.83 OTHER FATIGUE: ICD-10-CM

## 2024-01-07 DIAGNOSIS — M25.50 ARTHRALGIA, UNSPECIFIED JOINT: ICD-10-CM

## 2024-01-07 LAB
THYROGLOB AB SERPL-ACNC: <0.9 IU/ML (ref 0–4)
TSH RECEP AB SER-ACNC: <1.1 IU/L

## 2024-01-09 ENCOUNTER — TELEPHONE (OUTPATIENT)
Dept: PRIMARY CARE | Facility: CLINIC | Age: 49
End: 2024-01-09
Payer: COMMERCIAL

## 2024-01-09 DIAGNOSIS — E78.5 HYPERLIPIDEMIA, UNSPECIFIED HYPERLIPIDEMIA TYPE: Primary | ICD-10-CM

## 2024-01-09 LAB
ANA PATTERN: ABNORMAL
ANA SER QL HEP2 SUBST: POSITIVE
ANA TITR SER IF: ABNORMAL {TITER}
CENTROMERE B AB SER-ACNC: <0.2 AI
CHROMATIN AB SERPL-ACNC: <0.2 AI
DSDNA AB SER-ACNC: 8 IU/ML
ENA JO1 AB SER QL IA: <0.2 AI
ENA RNP AB SER IA-ACNC: 0.2 AI
ENA SCL70 AB SER QL IA: <0.2 AI
ENA SM AB SER IA-ACNC: <0.2 AI
ENA SM+RNP AB SER QL IA: <0.2 AI
ENA SS-A AB SER IA-ACNC: <0.2 AI
ENA SS-B AB SER IA-ACNC: <0.2 AI
RIBOSOMAL P AB SER-ACNC: <0.2 AI

## 2024-01-09 RX ORDER — ICOSAPENT ETHYL 1 G/1
2 CAPSULE ORAL
Qty: 120 CAPSULE | Refills: 11 | Status: SHIPPED | OUTPATIENT
Start: 2024-01-09 | End: 2025-01-08

## 2024-01-09 RX ORDER — ROSUVASTATIN CALCIUM 10 MG/1
10 TABLET, COATED ORAL DAILY
Qty: 30 TABLET | Refills: 11 | Status: SHIPPED | OUTPATIENT
Start: 2024-01-09 | End: 2025-01-08

## 2024-01-09 NOTE — TELEPHONE ENCOUNTER
Any provider or urgent care if not severe ,  if headaches severe to ER    I have sent in 2 meds for chol/triglycerides to pharm      Start those (1 very pricey, insurance  might not cover but we can try)    Rosuvastatin and vaspeca sent to pharm    Referral to cardio and pharm D to help w cholest meds/ coverage    To ER if headaches severe    Nothing else in labs would suggest cause of headaches    I wish I could bring her in -   see if anyone has a slot pls

## 2024-01-09 NOTE — TELEPHONE ENCOUNTER
Patient called today, said that she has been having really bad headaches everyday. She is concerned because of how her labs results came back, patient has an OV with you 2/1 to go over these labs but feels with these headaches she needs seen sooner. You have no openings except for same day acute slots. Can we schedule in one or anywhere else please advise.

## 2024-01-09 NOTE — TELEPHONE ENCOUNTER
Spoke with patient she is aware and verbalized understanding. Scheduled her with Dr. Rudolph for 1/11, she is aware to go to ER if headaches are severe or become severe.

## 2024-01-10 NOTE — PROGRESS NOTES
Now with all labs back -       Elevation in a type of antibody that can be associated with lupus,   in light of that data plus your symptoms I have placed a rheumatology appt     Keep upcoming appt w me as the wait time for rheum is often months

## 2024-01-11 ENCOUNTER — OFFICE VISIT (OUTPATIENT)
Dept: PRIMARY CARE | Facility: CLINIC | Age: 49
End: 2024-01-11
Payer: COMMERCIAL

## 2024-01-11 VITALS
SYSTOLIC BLOOD PRESSURE: 136 MMHG | WEIGHT: 234.1 LBS | OXYGEN SATURATION: 95 % | DIASTOLIC BLOOD PRESSURE: 96 MMHG | HEART RATE: 82 BPM | TEMPERATURE: 98 F | BODY MASS INDEX: 35.08 KG/M2

## 2024-01-11 DIAGNOSIS — M79.671 PAIN IN BOTH FEET: ICD-10-CM

## 2024-01-11 DIAGNOSIS — Z86.39 HISTORY OF HYPOTHYROIDISM: ICD-10-CM

## 2024-01-11 DIAGNOSIS — R51.9 DAILY HEADACHE: Primary | ICD-10-CM

## 2024-01-11 DIAGNOSIS — M79.89 FOOT SWELLING: ICD-10-CM

## 2024-01-11 DIAGNOSIS — M79.672 PAIN IN BOTH FEET: ICD-10-CM

## 2024-01-11 DIAGNOSIS — I10 HYPERTENSION, UNSPECIFIED TYPE: ICD-10-CM

## 2024-01-11 PROCEDURE — 3075F SYST BP GE 130 - 139MM HG: CPT | Performed by: FAMILY MEDICINE

## 2024-01-11 PROCEDURE — 1036F TOBACCO NON-USER: CPT | Performed by: FAMILY MEDICINE

## 2024-01-11 PROCEDURE — 3080F DIAST BP >= 90 MM HG: CPT | Performed by: FAMILY MEDICINE

## 2024-01-11 PROCEDURE — 3008F BODY MASS INDEX DOCD: CPT | Performed by: FAMILY MEDICINE

## 2024-01-11 PROCEDURE — 99214 OFFICE O/P EST MOD 30 MIN: CPT | Performed by: FAMILY MEDICINE

## 2024-01-11 RX ORDER — LISINOPRIL 5 MG/1
5 TABLET ORAL DAILY
Qty: 30 TABLET | Refills: 2 | Status: SHIPPED | OUTPATIENT
Start: 2024-01-11 | End: 2024-07-09

## 2024-01-11 NOTE — PROGRESS NOTES
Subjective   Patient ID: Arcelia Kaba is a 48 y.o. female who presents for Headache, Hypertension, Foot Swelling (Swelling in both feet, worse in the right. ), and Labs Only (Follow up labs ).  HPI  Headaches  :recently . sinceThxgiving  . Almostevery day sides of back of head     Migraine h/a :  light sensitivity , nausea, more intense. Occuredon Monday.  Used to have these but none for years.  Duration :  all daymonday. Tuesday was better.  Spots in vision           2 wks ago -  sore thr,congestion ,coughing.  Steroid (urgent care) . Exposed to strep  . Congestion better.  Throat still sore .      Treatment:   Ibuprofen  Cold/ flu med  Tried spray for her feet (athletes foot)     Other: Swelling feet   Saw podiatrist this afternoon,  nail fungus,right.  Pain andswellingin both feet ,stinging and burningat night when ramírez to sleep. Approx a month       Labwork - sugar, lipid        Review of Systems    Objective   BP (!) 136/96 (BP Location: Right arm, Patient Position: Sitting, BP Cuff Size: Large adult)   Pulse 82   Temp 36.7 °C (98 °F) (Temporal)   Wt 106 kg (234 lb 1.6 oz)   SpO2 95%   BMI 35.08 kg/m²     Physical Exam    Assessment/Plan   Problem List Items Addressed This Visit          Medium    HTN (hypertension)    Relevant Medications    lisinopril 5 mg tablet     Other Visit Diagnoses       Daily headache    -  Primary    Foot swelling        Pain in both feet        History of hypothyroidism            Daily headache: Unclear etiology.  Suspect related to elevated blood pressure/hypertension, possibly related to eyestrain.  -Eye exam recommended  -Start blood pressure medication and continue to monitor blood pressure at home  -Will review blood pressure readings and headache symptoms at follow-up    Migraine headache: Had a single migraine recently  -No specific treatment at this time, patient to monitor for frequency and intensity    History of hypothyroidism  Patient has been off of her thyroid  medicine for few months.  Is not clear why she stopped it.  -Recent lab work for thyroid was normal.  Do not restart thyroid medicine at this time      Foot swelling, bilateral: new.  Unclear etiology   Improves with elevation this may be related to lymphedema    Foot pain and burning: new  Patient states her podiatrist and she discussed  suggested patient complete the treatment prescribed(oral and topical med).  Podiatrist will be sending patient on to a specialist if the treatment prescribed does not resolve her symptoms.     Hypercholesterolemia, hypertriglyceridemia: Not yet started on treatment, is waiting assistance from pharmacy due to cost    Borderline elevated blood sugar: Reassurance, this may improve with treatment of her hypertriglyceridemia    Patient has questions about multiple medications that are to be started cholesterol, blood pressure triglyceride med and 2 meds from the podiatrist.  I suggested  Start Podiatry meds, thennext week start the BP med   Can start Cholesterol Meds when available and if that is 3 to 4 weeks away, that is okay     Follow up virtual 3 weeks      AKASH Rudolph MD

## 2024-01-18 ENCOUNTER — TELEMEDICINE (OUTPATIENT)
Dept: PHARMACY | Facility: HOSPITAL | Age: 49
End: 2024-01-18
Payer: COMMERCIAL

## 2024-01-18 DIAGNOSIS — E78.5 HYPERLIPIDEMIA, UNSPECIFIED HYPERLIPIDEMIA TYPE: ICD-10-CM

## 2024-01-18 NOTE — ASSESSMENT & PLAN NOTE
Patient was referred for management of cholesterol/ triglyceride levels.  Currently the patient's triglycerides are 811.  Patient was recently prescribed Rosuvastatin and Vascepa at last PCP visit, however has not picked up the medication yet.  Discussed the MOA, side effects, and administration of both medications.  Patient verbalized understanding and will  medication today from the pharmacy.  Copay/ Savings card was given to patient to help with the cost of the Vascepa as the medication can be quite expensive.  Also discussed diet changes such as limiting butter/margarine and other oils that can increase levels.      PLAN:  - Start Rosuvastatin 10 mg once daily and Vascepa 2 grams twice daily.    - Work on limiting excessive oils such as butter and margarine.  Continue to eat lean meats and non- fried vegetables.      Follow up: 4 weeks

## 2024-01-18 NOTE — PROGRESS NOTES
"Subjective   Patient ID: Arcelia Kaba is a 48 y.o. female who presents for Hyperlipidemia.    Referring Provider: Donna Mello, DO     Hyperlipidemia  This is a chronic problem. The problem is uncontrolled. Recent lipid tests were reviewed and are high. She is currently on no antihyperlipidemic treatment.       Review of Systems    Medication System Management:  Affordability/Accessibility: Patient states Vascepa was expensive at the pharmacy  Adherence/Organization: None   Adverse Effects: None    Objective     There were no vitals taken for this visit.     Labs  Lab Results   Component Value Date    BILITOT 1.7 (H) 01/05/2024    CALCIUM 9.2 01/05/2024    CO2 29 01/05/2024     01/05/2024    CREATININE 0.78 01/05/2024    GLUCOSE 108 (H) 01/05/2024    ALKPHOS 46 01/05/2024    K 4.0 01/05/2024    PROT 7.4 01/05/2024     01/05/2024    AST 26 01/05/2024    ALT 39 01/05/2024    BUN 13 01/05/2024    ANIONGAP 11 01/05/2024    PHOS 2.6 08/14/2020    ALBUMIN 4.3 01/05/2024    GFRF >90 03/25/2022     Lab Results   Component Value Date    TRIG 811 (H) 01/05/2024    CHOL 207 (H) 01/05/2024    LDLCALC  01/05/2024      Comment:      The calculation of LDL and VLDL are inaccurate when the Triglycerides are greater than 400 mg/dL or when the patient is non-fasting. If LDL measurement is necessary contact the testing laboratory for an alternative LDL assay.                                  Near   Borderline      AGE      Desirable  Optimal    High     High     Very High     0-19 Y     0 - 109     ---    110-129   >/= 130     ----    20-24 Y     0 - 119     ---    120-159   >/= 160     ----      >24 Y     0 -  99   100-129  130-159   160-189     >/=190      HDL 25.4 01/05/2024     No results found for: \"HGBA1C\"    Current Outpatient Medications on File Prior to Visit   Medication Sig Dispense Refill    albuterol 90 mcg/actuation inhaler Inhale 2 puffs every 4 hours if needed for wheezing or shortness of breath.   "    buPROPion XL (Wellbutrin XL) 150 mg 24 hr tablet Take 1 tablet (150 mg) by mouth once daily.      fluticasone (Flonase) 50 mcg/actuation nasal spray Administer 1 spray into affected nostril(s) once daily.      icosapent ethyL (Vascepa) 1 gram capsule Take 2 capsules (2 g) by mouth 2 times a day with meals. 120 capsule 11    lisinopril 5 mg tablet Take 1 tablet (5 mg) by mouth once daily. 30 tablet 2    rosuvastatin (Crestor) 10 mg tablet Take 1 tablet (10 mg) by mouth once daily. 30 tablet 11     No current facility-administered medications on file prior to visit.        Assessment/Plan   Problem List Items Addressed This Visit       Hyperlipidemia     Patient was referred for management of cholesterol/ triglyceride levels.  Currently the patient's triglycerides are 811.  Patient was recently prescribed Rosuvastatin and Vascepa at last PCP visit, however has not picked up the medication yet.  Discussed the MOA, side effects, and administration of both medications.  Patient verbalized understanding and will  medication today from the pharmacy.  Copay/ Savings card was given to patient to help with the cost of the Vascepa as the medication can be quite expensive.  Also discussed diet changes such as limiting butter/margarine and other oils that can increase levels.      PLAN:  - Start Rosuvastatin 10 mg once daily and Vascepa 2 grams twice daily.    - Work on limiting excessive oils such as butter and margarine.  Continue to eat lean meats and non- fried vegetables.      Follow up: 4 weeks              Kandi Koenig PharmD    Continue all meds under the continuation of care with the referring provider and clinical pharmacy team.

## 2024-02-01 ENCOUNTER — APPOINTMENT (OUTPATIENT)
Dept: PRIMARY CARE | Facility: CLINIC | Age: 49
End: 2024-02-01
Payer: COMMERCIAL

## 2024-02-15 ENCOUNTER — TELEPHONE (OUTPATIENT)
Dept: PRIMARY CARE | Facility: CLINIC | Age: 49
End: 2024-02-15

## 2024-02-15 ENCOUNTER — TELEMEDICINE (OUTPATIENT)
Dept: PHARMACY | Facility: HOSPITAL | Age: 49
End: 2024-02-15
Payer: COMMERCIAL

## 2024-02-15 DIAGNOSIS — E66.9 CLASS 1 OBESITY WITH BODY MASS INDEX (BMI) OF 33.0 TO 33.9 IN ADULT, UNSPECIFIED OBESITY TYPE, UNSPECIFIED WHETHER SERIOUS COMORBIDITY PRESENT: Primary | ICD-10-CM

## 2024-02-15 DIAGNOSIS — E78.5 HYPERLIPIDEMIA, UNSPECIFIED HYPERLIPIDEMIA TYPE: ICD-10-CM

## 2024-02-15 NOTE — PROGRESS NOTES
"Subjective   Patient ID: Arcelia Kaba is a 48 y.o. female who presents for Hyperlipidemia.    Referring Provider: Donna Mello, DO     Hyperlipidemia  This is a chronic problem. The problem is uncontrolled. Recent lipid tests were reviewed and are high. Current antihyperlipidemic treatment includes statins and exercise.       Review of Systems    Medication System Management:  Affordability/Accessibility: Patient states Vascepa was expensive at the pharmacy  Adherence/Organization: None   Adverse Effects: None    Objective     There were no vitals taken for this visit.     Labs  Lab Results   Component Value Date    BILITOT 1.7 (H) 01/05/2024    CALCIUM 9.2 01/05/2024    CO2 29 01/05/2024     01/05/2024    CREATININE 0.78 01/05/2024    GLUCOSE 108 (H) 01/05/2024    ALKPHOS 46 01/05/2024    K 4.0 01/05/2024    PROT 7.4 01/05/2024     01/05/2024    AST 26 01/05/2024    ALT 39 01/05/2024    BUN 13 01/05/2024    ANIONGAP 11 01/05/2024    PHOS 2.6 08/14/2020    ALBUMIN 4.3 01/05/2024    GFRF >90 03/25/2022     Lab Results   Component Value Date    TRIG 811 (H) 01/05/2024    CHOL 207 (H) 01/05/2024    LDLCALC  01/05/2024      Comment:      The calculation of LDL and VLDL are inaccurate when the Triglycerides are greater than 400 mg/dL or when the patient is non-fasting. If LDL measurement is necessary contact the testing laboratory for an alternative LDL assay.                                  Near   Borderline      AGE      Desirable  Optimal    High     High     Very High     0-19 Y     0 - 109     ---    110-129   >/= 130     ----    20-24 Y     0 - 119     ---    120-159   >/= 160     ----      >24 Y     0 -  99   100-129  130-159   160-189     >/=190      HDL 25.4 01/05/2024     No results found for: \"HGBA1C\"    Current Outpatient Medications on File Prior to Visit   Medication Sig Dispense Refill    albuterol 90 mcg/actuation inhaler Inhale 2 puffs every 4 hours if needed for wheezing or " shortness of breath.      buPROPion XL (Wellbutrin XL) 150 mg 24 hr tablet Take 1 tablet (150 mg) by mouth once daily.      fluticasone (Flonase) 50 mcg/actuation nasal spray Administer 1 spray into affected nostril(s) once daily.      icosapent ethyL (Vascepa) 1 gram capsule Take 2 capsules (2 g) by mouth 2 times a day with meals. 120 capsule 11    lisinopril 5 mg tablet Take 1 tablet (5 mg) by mouth once daily. 30 tablet 2    rosuvastatin (Crestor) 10 mg tablet Take 1 tablet (10 mg) by mouth once daily. 30 tablet 11     No current facility-administered medications on file prior to visit.        Assessment/Plan   Problem List Items Addressed This Visit       Hyperlipidemia     Patient was referred for management of cholesterol/ triglyceride levels.  Currently the patient's triglycerides are 811.  Patient was recently prescribed Rosuvastatin and Vascepa at last PCP visit, however has not picked up the medication yet.  Discussed the MOA, side effects, and administration of both medications.   Also discussed diet changes such as limiting butter/margarine and other oils that can increase levels.  Since last visit patient has started to exercise 5 days a week and has increased protein intake.  Patient states that she has no issues/ side effects with rosuvastatin so far, however she had abdominal cramping and some GI effects with the dose of Vascepa.       PLAN:  - Continue Rosuvastatin 10 mg once daily  - Decrease Vascepa  to 1 gram twice daily to see if side effects of abdominal pain and GI symptoms resolve  - Work on limiting excessive oils such as butter and margarine.  Continue to eat lean meats and non- fried vegetables.      Follow up: 2 weeks           Relevant Orders    Follow Up In [facility] Pharm[facility]       Kandi Koenig, PharmD    Continue all meds under the continuation of care with the referring provider and clinical pharmacy team.

## 2024-02-15 NOTE — ASSESSMENT & PLAN NOTE
Patient was referred for management of cholesterol/ triglyceride levels.  Currently the patient's triglycerides are 811.  Patient was recently prescribed Rosuvastatin and Vascepa at last PCP visit, however has not picked up the medication yet.  Discussed the MOA, side effects, and administration of both medications.   Also discussed diet changes such as limiting butter/margarine and other oils that can increase levels.  Since last visit patient has started to exercise 5 days a week and has increased protein intake.  Patient states that she has no issues/ side effects with rosuvastatin so far, however she had abdominal cramping and some GI effects with the dose of Vascepa.       PLAN:  - Continue Rosuvastatin 10 mg once daily  - Decrease Vascepa  to 1 gram twice daily to see if side effects of abdominal pain and GI symptoms resolve  - Work on limiting excessive oils such as butter and margarine.  Continue to eat lean meats and non- fried vegetables.      Follow up: 2 weeks

## 2024-02-16 NOTE — TELEPHONE ENCOUNTER
----- Message from Kandi Koenig PharmD sent at 2/15/2024  1:08 PM EST -----  Regarding: Referral Addition  Hi Dr. Mello,     During our telephone visit today the patient mention the GLP-1 injections for weight loss.  She was interested in maybe getting started with one of these if the cost is affordable.  I looked into her insurance and found that her insurance just requires a PA for Wegovy and Saxenda.  In your opinion would this be an appropriate addition for this patient? If so would I be able to add obesity to the referral and help manage that as well?  Please let me know if you have any questions or concerns.      Thank you,   Kandi Koenig, PharmD  Clinical Pharmacist

## 2024-02-16 NOTE — TELEPHONE ENCOUNTER
Absolutely!   Thank you!      Yes, I think a GLP-1Ag would be a great choice for her     Referral has been placed, diagnosis has been added     Thank you, Kandi!

## 2024-02-29 ENCOUNTER — APPOINTMENT (OUTPATIENT)
Dept: PRIMARY CARE | Facility: CLINIC | Age: 49
End: 2024-02-29
Payer: COMMERCIAL

## 2024-03-07 ENCOUNTER — APPOINTMENT (OUTPATIENT)
Dept: PRIMARY CARE | Facility: CLINIC | Age: 49
End: 2024-03-07
Payer: COMMERCIAL

## 2024-03-07 NOTE — PROGRESS NOTES
Subjective   Patient ID: Arcelia Kaba is a 48 y.o. female who presents for No chief complaint on file..    HPI     Patient presents today for routine 6 month follow-up.  Patient is doing well overall, they have no new concerns or issues.     ROUTINE VISIT  CHRONIC CONDITIONS:   Depression  Current regimen:  Bupropion 300 mg daily    Prior medications:   Venlafaxine added on 2/2022, however stopped in 3/2022 due to possible side effects.    HTN (hypertension)    Current regimen:  Lisinopril?    Prior medications:   HCTZ 6/2022, no longer on med list 7/2023  Amlodipine 2.5 mg, unable to tolerate due to edema.    Hyperlipidemia  Referred to clinical pharmacist due to elevated triglycerides 1/2024  FMHx of CAD on father's side.  No CACS in our records.  01/2024 TC/HDL ratio 8.1 (HDL low, TRIG in 800s).    Current regimen:  Rosuvastatin 10 mg once daily, started 1/2024  Vascepa 1 g twice daily, started 1/2024    Hypothyroidism   FMHx of significant thyroid disease (multiple family members with Hashimoto's, Graves)    Current regimen: Levothyroxine 50 mcg daily    01/2024 Anti-TPO AB neg, Thyrotropin AB neg, Anti-thyroglobulin AB neg  01/2024 TSH normal, T3 normal    H/O parathyroidectomy  Episode of severe hypercalcemia with Ca >14 and PTH level >400.   She was treated with cellmate and her calcium levels normalized down to 11.4.   The effective the Zometa should last about 3-4 weeks.   Sestamibi scan US performed by Dr. Daly highly consistent with a right inferior parathyroid gland.  S/p parathyroidectomy February 2021, with intraoperative PTH levels normalizing.    Review of Systems   All other systems reviewed and are negative.      Objective   There were no vitals taken for this visit.    Physical Exam  Vitals and nursing note reviewed.   Constitutional:       Appearance: Normal appearance.   HENT:      Head: Normocephalic and atraumatic.      Right Ear: External ear normal.      Left Ear: External ear normal.       Nose: Nose normal.   Eyes:      Conjunctiva/sclera: Conjunctivae normal.   Cardiovascular:      Rate and Rhythm: Normal rate and regular rhythm.      Heart sounds: Normal heart sounds.   Pulmonary:      Effort: Pulmonary effort is normal.      Breath sounds: Normal breath sounds.   Abdominal:      General: Bowel sounds are normal.   Musculoskeletal:      Cervical back: Neck supple.   Skin:     General: Skin is warm and dry.   Neurological:      General: No focal deficit present.      Mental Status: She is alert and oriented to person, place, and time.   Psychiatric:         Mood and Affect: Mood normal.         Assessment/Plan   {Assess/PlanSmartLinks:67887}    Follow-up in * for recheck *  Follow-up in * for routine care.  Follow-up in 1 year for annual physical.   Call for sooner follow-up if needed.       Scribe Attestation  By signing my name below, Shameka CHANEL, Scrjesus manuel   attest that this documentation has been prepared under the direction and in the presence of Donna Mello DO.

## 2024-12-16 ENCOUNTER — APPOINTMENT (OUTPATIENT)
Dept: PRIMARY CARE | Facility: CLINIC | Age: 49
End: 2024-12-16
Payer: COMMERCIAL

## 2024-12-16 ENCOUNTER — LAB (OUTPATIENT)
Dept: LAB | Facility: LAB | Age: 49
End: 2024-12-16
Payer: COMMERCIAL

## 2024-12-16 VITALS
HEIGHT: 70 IN | SYSTOLIC BLOOD PRESSURE: 163 MMHG | TEMPERATURE: 98.5 F | WEIGHT: 223.2 LBS | DIASTOLIC BLOOD PRESSURE: 115 MMHG | HEART RATE: 70 BPM | BODY MASS INDEX: 31.95 KG/M2 | OXYGEN SATURATION: 96 %

## 2024-12-16 DIAGNOSIS — I10 HYPERTENSION, UNSPECIFIED TYPE: ICD-10-CM

## 2024-12-16 DIAGNOSIS — R23.2 HOT FLASHES: ICD-10-CM

## 2024-12-16 DIAGNOSIS — E89.2 H/O PARATHYROIDECTOMY (CMS/HCC): ICD-10-CM

## 2024-12-16 DIAGNOSIS — E03.9 HYPOTHYROIDISM, UNSPECIFIED TYPE: ICD-10-CM

## 2024-12-16 DIAGNOSIS — E78.5 HYPERLIPIDEMIA, UNSPECIFIED HYPERLIPIDEMIA TYPE: ICD-10-CM

## 2024-12-16 DIAGNOSIS — R14.0 BLOATING: ICD-10-CM

## 2024-12-16 DIAGNOSIS — R97.1 ELEVATED CA-125: ICD-10-CM

## 2024-12-16 DIAGNOSIS — R14.0 BLOATING: Primary | ICD-10-CM

## 2024-12-16 LAB
ALBUMIN SERPL BCP-MCNC: 4.4 G/DL (ref 3.4–5)
ALP SERPL-CCNC: 54 U/L (ref 33–110)
ALT SERPL W P-5'-P-CCNC: 35 U/L (ref 7–45)
ANION GAP SERPL CALC-SCNC: 11 MMOL/L (ref 10–20)
AST SERPL W P-5'-P-CCNC: 22 U/L (ref 9–39)
BASOPHILS # BLD AUTO: 0.1 X10*3/UL (ref 0–0.1)
BASOPHILS NFR BLD AUTO: 1.4 %
BILIRUB SERPL-MCNC: 1.6 MG/DL (ref 0–1.2)
BUN SERPL-MCNC: 14 MG/DL (ref 6–23)
CALCIUM SERPL-MCNC: 9.4 MG/DL (ref 8.6–10.6)
CANCER AG125 SERPL-ACNC: 12.3 U/ML (ref 0–30.2)
CANCER AG19-9 SERPL-ACNC: <4 U/ML
CEA SERPL-MCNC: <0.5 UG/L
CHLORIDE SERPL-SCNC: 103 MMOL/L (ref 98–107)
CHOLEST SERPL-MCNC: 168 MG/DL (ref 0–199)
CHOLESTEROL/HDL RATIO: 4.9
CO2 SERPL-SCNC: 28 MMOL/L (ref 21–32)
CREAT SERPL-MCNC: 0.81 MG/DL (ref 0.5–1.05)
CRP SERPL-MCNC: 0.13 MG/DL
EGFRCR SERPLBLD CKD-EPI 2021: 90 ML/MIN/1.73M*2
EOSINOPHIL # BLD AUTO: 0.19 X10*3/UL (ref 0–0.7)
EOSINOPHIL NFR BLD AUTO: 2.6 %
ERYTHROCYTE [DISTWIDTH] IN BLOOD BY AUTOMATED COUNT: 12.7 % (ref 11.5–14.5)
ERYTHROCYTE [SEDIMENTATION RATE] IN BLOOD BY WESTERGREN METHOD: 10 MM/H (ref 0–20)
EST. AVERAGE GLUCOSE BLD GHB EST-MCNC: 105 MG/DL
GLUCOSE SERPL-MCNC: 104 MG/DL (ref 74–99)
HBA1C MFR BLD: 5.3 %
HCT VFR BLD AUTO: 42.2 % (ref 36–46)
HDLC SERPL-MCNC: 34.4 MG/DL
HGB BLD-MCNC: 14.4 G/DL (ref 12–16)
IMM GRANULOCYTES # BLD AUTO: 0.02 X10*3/UL (ref 0–0.7)
IMM GRANULOCYTES NFR BLD AUTO: 0.3 % (ref 0–0.9)
LDLC SERPL CALC-MCNC: 99 MG/DL
LYMPHOCYTES # BLD AUTO: 2.07 X10*3/UL (ref 1.2–4.8)
LYMPHOCYTES NFR BLD AUTO: 28 %
MCH RBC QN AUTO: 29.4 PG (ref 26–34)
MCHC RBC AUTO-ENTMCNC: 34.1 G/DL (ref 32–36)
MCV RBC AUTO: 86 FL (ref 80–100)
MONOCYTES # BLD AUTO: 0.44 X10*3/UL (ref 0.1–1)
MONOCYTES NFR BLD AUTO: 6 %
NEUTROPHILS # BLD AUTO: 4.56 X10*3/UL (ref 1.2–7.7)
NEUTROPHILS NFR BLD AUTO: 61.7 %
NON HDL CHOLESTEROL: 134 MG/DL (ref 0–149)
NRBC BLD-RTO: 0 /100 WBCS (ref 0–0)
PLATELET # BLD AUTO: 266 X10*3/UL (ref 150–450)
POTASSIUM SERPL-SCNC: 4.2 MMOL/L (ref 3.5–5.3)
PROT SERPL-MCNC: 7.5 G/DL (ref 6.4–8.2)
RBC # BLD AUTO: 4.9 X10*6/UL (ref 4–5.2)
SODIUM SERPL-SCNC: 138 MMOL/L (ref 136–145)
TRIGL SERPL-MCNC: 172 MG/DL (ref 0–149)
TSH SERPL-ACNC: 2.57 MIU/L (ref 0.44–3.98)
VLDL: 34 MG/DL (ref 0–40)
WBC # BLD AUTO: 7.4 X10*3/UL (ref 4.4–11.3)

## 2024-12-16 PROCEDURE — 99214 OFFICE O/P EST MOD 30 MIN: CPT | Performed by: STUDENT IN AN ORGANIZED HEALTH CARE EDUCATION/TRAINING PROGRAM

## 2024-12-16 PROCEDURE — 82378 CARCINOEMBRYONIC ANTIGEN: CPT

## 2024-12-16 PROCEDURE — 80053 COMPREHEN METABOLIC PANEL: CPT

## 2024-12-16 PROCEDURE — 80061 LIPID PANEL: CPT

## 2024-12-16 PROCEDURE — 1036F TOBACCO NON-USER: CPT | Performed by: STUDENT IN AN ORGANIZED HEALTH CARE EDUCATION/TRAINING PROGRAM

## 2024-12-16 PROCEDURE — 85652 RBC SED RATE AUTOMATED: CPT

## 2024-12-16 PROCEDURE — 86304 IMMUNOASSAY TUMOR CA 125: CPT

## 2024-12-16 PROCEDURE — 36415 COLL VENOUS BLD VENIPUNCTURE: CPT

## 2024-12-16 PROCEDURE — 84443 ASSAY THYROID STIM HORMONE: CPT

## 2024-12-16 PROCEDURE — 3080F DIAST BP >= 90 MM HG: CPT | Performed by: STUDENT IN AN ORGANIZED HEALTH CARE EDUCATION/TRAINING PROGRAM

## 2024-12-16 PROCEDURE — 3077F SYST BP >= 140 MM HG: CPT | Performed by: STUDENT IN AN ORGANIZED HEALTH CARE EDUCATION/TRAINING PROGRAM

## 2024-12-16 PROCEDURE — 86301 IMMUNOASSAY TUMOR CA 19-9: CPT

## 2024-12-16 PROCEDURE — 86140 C-REACTIVE PROTEIN: CPT

## 2024-12-16 PROCEDURE — 85025 COMPLETE CBC W/AUTO DIFF WBC: CPT

## 2024-12-16 PROCEDURE — 83036 HEMOGLOBIN GLYCOSYLATED A1C: CPT

## 2024-12-16 PROCEDURE — 3008F BODY MASS INDEX DOCD: CPT | Performed by: STUDENT IN AN ORGANIZED HEALTH CARE EDUCATION/TRAINING PROGRAM

## 2024-12-16 RX ORDER — CITALOPRAM 10 MG/1
10 TABLET ORAL DAILY
Qty: 30 TABLET | Refills: 1 | Status: SHIPPED | OUTPATIENT
Start: 2024-12-16 | End: 2025-02-14

## 2024-12-16 RX ORDER — LISINOPRIL 20 MG/1
20 TABLET ORAL NIGHTLY
Qty: 90 TABLET | Refills: 0 | Status: SHIPPED | OUTPATIENT
Start: 2024-12-16

## 2024-12-16 NOTE — PATIENT INSTRUCTIONS
PLAN   - increase lisinopril to 20mg   - Start Celexa  - Take lisinopril and Crestor at night time.   - Labs   - TVUS   - See me back in 4-6wks     Have a happy holiday season!

## 2024-12-16 NOTE — PROGRESS NOTES
FAMILY MEDICINE  OFFICE VISIT   Arcelia Kaba  [id number]  1975    PCP: Donna Mello DO     Chief Complaint:   Chief Complaint   Patient presents with    menopausal sxs     Pt presents for menopausal sxs- pt states that around her cycle she is having GI issues, brain fog, issues with concentrating, joint pain, so tired throughout the day but is waking up constantly throughout the night. States that she is trying magnesium to help with sleep and GI issues.    Flu Vaccine     Pt declines flu vaccine at this time.        SUBJECTIVE     Arcelia Kaba is a 49 y.o. English-speaking female with pertinent PMHx of elevated , who presents to the clinic with complaints of menopausal symptoms.    Lydia-menopause  - Brain fog, lack of sleep, joint pain  - The sleep is the biggest issue   - Trying magnesium and ann  - Start a sentence and cannot finish it.   - Has 5 and 8yo (adopted)  - Tried sleepy time tea   - Often starts day at 3am because can't go back to sleep   - Partial hysterectomy years ago  - Bloating, swelling cramping, joint pain and water retention, IBS symptoms.   - Has been bothered by it for a couples months.   - Hot flashes, turn completely red.   - Switched deodorant and body washes multiples times. ANNA is awful   - Hysterectomy was about 10-11 years ago. It was at [facility]   - Had fibroids    - Multiple miscarriages   - Stood up at work oneday and blood just ran out of her   - Had gone to fertility for years.   - Has had symptoms of cycles after the hyst, but it was not until about 1 year after. Now last 8months it is really worse. No feels like every month.   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     The following portions of the patient's chart were reviewed in this encounter and updated as appropriate:  Tobacco  Allergies  Meds  Problems  Med Hx  Surg Hx  Fam Hx         Home Medication List:  Current Outpatient Medications   Medication Instructions    albuterol 90 mcg/actuation inhaler 2 puffs, Every 4 hours PRN    buPROPion XL (Wellbutrin XL) 150 mg 24 hr tablet 1 tablet, Daily    fluticasone (Flonase) 50 mcg/actuation nasal spray 1 spray, Daily    icosapent ethyL (VASCEPA) 2 g, oral, 2 times daily (morning and late afternoon)    lisinopril 5 mg, oral, Daily    rosuvastatin (CRESTOR) 10 mg, oral, Daily         OBJECTIVE   BP (!) 163/115 (BP Location: Left arm, Patient Position: Sitting, BP Cuff Size: Large adult)   Pulse 70   Temp 36.9 °C (98.5 °F) (Temporal)   Ht 1.765 m (5' 9.5\")   Wt 101 kg (223 lb 3.2 oz)   SpO2 96%   BMI 32.49 kg/m²   Vital signs and pulse oximetry reviewed.     Physical Exam  Vitals and nursing note reviewed.   Constitutional:       Appearance: Normal appearance.   HENT:      Head: Normocephalic and atraumatic.      Right Ear: External ear normal.      Left Ear: External ear normal.      Nose: Nose normal. No congestion or rhinorrhea.   Eyes:      General: No scleral icterus.     Conjunctiva/sclera: Conjunctivae normal.   Cardiovascular:      Rate and Rhythm: Normal rate and regular rhythm.      Heart sounds: No murmur heard.  Pulmonary:      Effort: Pulmonary effort is normal. No respiratory distress.      Breath sounds: Normal breath sounds. No wheezing, rhonchi or rales.   Abdominal:      General: Bowel sounds are normal. There is no distension.      Palpations: Abdomen is soft. There is no fluid wave.      Tenderness: There is no abdominal tenderness. There is no guarding or rebound.      Hernia: No hernia is present.      Comments: Fullness in lower abdomen, which may be fascial restriction from prior surgeries.    Musculoskeletal:      Right lower " leg: No edema.      Left lower leg: No edema.   Skin:     General: Skin is warm.      Coloration: Skin is not jaundiced.   Neurological:      Mental Status: She is alert. Mental status is at baseline.   Psychiatric:         Mood and Affect: Mood normal.         Behavior: Behavior normal.         ASSESSMENT & PLAN     Problem List Items Addressed This Visit       H/O parathyroidectomy (CMS/[facility])    Overview     - Hx of an episode of severe hypercalcemia (Ca >14), PTH >400.   - Treated with cellmate/Zometa, Ca normalized down to 11.4.   - Sestamibi scan and ultrasound (performed by Dr. Falcon) both highly consistent with a right inferior parathyroid gland.   - s/p Right Superior Parathyroidectomy 2/19/2021 (intra-op Ca normalized).   - Pathology: Clear Cell adenoma.          Current Assessment & Plan     Repeat labs today, no need for PTH at this time.          HTN (hypertension)    Overview     Started on HCTZ 6/2022, no longer on med list 7/2023  Unable to tolerate the Amlodipine 2.5 mg due to edema.         Current Assessment & Plan     HTN not well controlled. She has only been on Lisinopril 5mg, BP today 163/115. She has been having worsening hot flashes at home.   - At this point, patient needs increase of her BP regimen. Will increased Lisinopril to 20mg daily. I advised patient to take at night time for improved CV benefit with dipping of BP. Rx sent.   - I anticipate we will need to increase regimen again at next appt.   - She had a hysterectomy, so no need for BC with ACE/ARB tx.   - Due for labs: CBC, CMP, Lipid, TSH, A1c.   - Will follow-up HTN in 6wks.   - If needs additional regimen, consider uptitrating Lisinopril or adding BB.          Relevant Medications    lisinopril 20 mg tablet    Other Relevant Orders    Comprehensive Metabolic Panel (Completed)    CBC and Auto Differential (Completed)    Lipid Panel (Completed)    Tsh With Reflex To Free T4 If Abnormal (Completed)    Hemoglobin A1c (Completed)     Follow Up In [facility] - PCP - Established    Hyperlipidemia    Overview     - FMHx of CAD on father's side.  - 3/2022 TC/HDL ratio 7.1 (HDL low, TRIG in 800s).  - No CACS in our records.         Current Assessment & Plan     Patient with likely familial hyperlipidemia. She had significantly elevated TG in past.   - Due for labs today.   - Continue Crestor and Vascepa at this time.   - Advised to take Crestor at night.   - If LDL still uncontrolled, consider increasing Crestor.   - If TG still uncontrolled, consider adding Fibrate to regimen.   - Will discuss at follow-up appt.   - Consider CACS at next appt or well exam.          Hypothyroidism    Overview     - FMHx of significant thyroid disease (multiple family members with Hashimoto's, Graves)  - Previously on Levothyroxine 50 mcg QD  - 3/2022 TSH normal.  - Levothyroxine stopped 1/2024.          Current Assessment & Plan     Patient with hx of hypothyroidism, but stopped synthroid back in 1/2024. Has since had worsening hot flashes and bloating. Weight is actually down 10lbs.   - Will repeat labs today.   - No plan to restart medication at this time.   - Hx of Clear Cell adenoma in her parathyroids.          Relevant Orders    Comprehensive Metabolic Panel (Completed)    CBC and Auto Differential (Completed)    Tsh With Reflex To Free T4 If Abnormal (Completed)    Hemoglobin A1c (Completed)    Hot flashes    Current Assessment & Plan     Patient has been experiencing worsening menopausal symptoms recently, including hot flashes, bloating, and mood issues.  -At this time we discussed perimenopausal symptoms and ways to reduce those symptoms, including good blood pressure and blood glucose control.  We also discussed having good thyroid and parathyroid function, given her history.  -We will obtain lab work today to rule out metabolic causes of her symptoms including CBC, TSH, CMP, A1c.  -Please see below for workup for bloating.  -At this time we  will get her blood pressure under control, see her hypertension assessment and plan.  -Additionally we will start patient on Celexa 10 mg today to see if this aids in her vasomotor and mood symptoms.  Rx sent.  -I suspect we will have to increase her Celexa at her next appointment, as well as her blood pressure medication.  -If ESR and CRP are elevated consider rheumatology referral.         Relevant Medications    citalopram (CeleXA) 10 mg tablet    Other Relevant Orders    Sedimentation Rate (Completed)    C-reactive protein (Completed)     (Completed)    Follow Up In [facility] - PCP - Established    Cancer Antigen 19-9 (Completed)    CEA (Completed)    Elevated CA-125    Current Assessment & Plan     Patient experiencing bloating that is been worsening, as well as menopausal hot flashes.  She does have a history of an elevated  at an outside hospital.  -Given these new concerning symptoms, we will start workup for gynecologic source of the symptoms.  -Will obtain lab work: ESR, CRP, CMP, CBC, TSH, A1c, , CA 19-9, CEA.  -Will also obtain a transvaginal ultrasound, to investigate her ovaries as she had a partial hysterectomy previously.  If ultrasound is inconclusive consider getting a pelvic MRI.         Relevant Orders    US pelvis transvaginal (Completed)    Sedimentation Rate (Completed)    C-reactive protein (Completed)     (Completed)    Cancer Antigen 19-9 (Completed)    CEA (Completed)     Other Visit Diagnoses       Bloating    -  Primary    Relevant Orders    US pelvis transvaginal (Completed)     (Completed)    Cancer Antigen 19-9 (Completed)    CEA (Completed)          Level 4    Follow-Up Recommendations: 6wk for menopause and HTN    Please excuse any typos or grammatical errors, part of this note was constructed with Dragon dictation software.    Chinyere Gutierrez DO, Ke  [facility]   Office: [phone]  12/16/2024 11:20 AM

## 2024-12-24 ENCOUNTER — HOSPITAL ENCOUNTER (OUTPATIENT)
Dept: RADIOLOGY | Facility: CLINIC | Age: 49
Discharge: HOME | End: 2024-12-24
Payer: COMMERCIAL

## 2024-12-24 DIAGNOSIS — R97.1 ELEVATED CA-125: ICD-10-CM

## 2024-12-24 DIAGNOSIS — R14.0 BLOATING: ICD-10-CM

## 2024-12-24 PROCEDURE — 76830 TRANSVAGINAL US NON-OB: CPT

## 2024-12-29 PROBLEM — R23.2 HOT FLASHES: Status: ACTIVE | Noted: 2024-12-29

## 2024-12-29 PROBLEM — R97.1 ELEVATED CA-125: Status: ACTIVE | Noted: 2024-12-29

## 2024-12-29 NOTE — ASSESSMENT & PLAN NOTE
Patient with likely familial hyperlipidemia. She had significantly elevated TG in past.   - Due for labs today.   - Continue Crestor and Vascepa at this time.   - Advised to take Crestor at night.   - If LDL still uncontrolled, consider increasing Crestor.   - If TG still uncontrolled, consider adding Fibrate to regimen.   - Will discuss at follow-up appt.   - Consider CACS at next appt or well exam.

## 2024-12-29 NOTE — ASSESSMENT & PLAN NOTE
Patient has been experiencing worsening menopausal symptoms recently, including hot flashes, bloating, and mood issues.  -At this time we discussed perimenopausal symptoms and ways to reduce those symptoms, including good blood pressure and blood glucose control.  We also discussed having good thyroid and parathyroid function, given her history.  -We will obtain lab work today to rule out metabolic causes of her symptoms including CBC, TSH, CMP, A1c.  -Please see below for workup for bloating.  -At this time we will get her blood pressure under control, see her hypertension assessment and plan.  -Additionally we will start patient on Celexa 10 mg today to see if this aids in her vasomotor and mood symptoms.  Rx sent.  -I suspect we will have to increase her Celexa at her next appointment, as well as her blood pressure medication.  -If ESR and CRP are elevated consider rheumatology referral.

## 2024-12-29 NOTE — ASSESSMENT & PLAN NOTE
Patient with hx of hypothyroidism, but stopped synthroid back in 1/2024. Has since had worsening hot flashes and bloating. Weight is actually down 10lbs.   - Will repeat labs today.   - No plan to restart medication at this time.   - Hx of Clear Cell adenoma in her parathyroids.

## 2024-12-29 NOTE — ASSESSMENT & PLAN NOTE
Patient experiencing bloating that is been worsening, as well as menopausal hot flashes.  She does have a history of an elevated  at an outside hospital.  -Given these new concerning symptoms, we will start workup for gynecologic source of the symptoms.  -Will obtain lab work: ESR, CRP, CMP, CBC, TSH, A1c, , CA 19-9, CEA.  -Will also obtain a transvaginal ultrasound, to investigate her ovaries as she had a partial hysterectomy previously.  If ultrasound is inconclusive consider getting a pelvic MRI.

## 2025-02-06 ENCOUNTER — APPOINTMENT (OUTPATIENT)
Dept: PRIMARY CARE | Facility: CLINIC | Age: 50
End: 2025-02-06
Payer: COMMERCIAL

## 2025-02-06 VITALS
OXYGEN SATURATION: 97 % | DIASTOLIC BLOOD PRESSURE: 75 MMHG | SYSTOLIC BLOOD PRESSURE: 117 MMHG | WEIGHT: 225.6 LBS | BODY MASS INDEX: 32.84 KG/M2 | HEART RATE: 60 BPM | TEMPERATURE: 97.7 F

## 2025-02-06 DIAGNOSIS — I10 HYPERTENSION, UNSPECIFIED TYPE: ICD-10-CM

## 2025-02-06 DIAGNOSIS — R23.2 HOT FLASHES: Primary | ICD-10-CM

## 2025-02-06 DIAGNOSIS — R06.83 SNORING: ICD-10-CM

## 2025-02-06 DIAGNOSIS — H66.93 BILATERAL ACUTE OTITIS MEDIA: ICD-10-CM

## 2025-02-06 DIAGNOSIS — R40.0 DAYTIME SOMNOLENCE: ICD-10-CM

## 2025-02-06 DIAGNOSIS — R06.02 SHORTNESS OF BREATH: ICD-10-CM

## 2025-02-06 PROCEDURE — 3074F SYST BP LT 130 MM HG: CPT | Performed by: STUDENT IN AN ORGANIZED HEALTH CARE EDUCATION/TRAINING PROGRAM

## 2025-02-06 PROCEDURE — 3078F DIAST BP <80 MM HG: CPT | Performed by: STUDENT IN AN ORGANIZED HEALTH CARE EDUCATION/TRAINING PROGRAM

## 2025-02-06 PROCEDURE — 99214 OFFICE O/P EST MOD 30 MIN: CPT | Performed by: STUDENT IN AN ORGANIZED HEALTH CARE EDUCATION/TRAINING PROGRAM

## 2025-02-06 PROCEDURE — 1036F TOBACCO NON-USER: CPT | Performed by: STUDENT IN AN ORGANIZED HEALTH CARE EDUCATION/TRAINING PROGRAM

## 2025-02-06 RX ORDER — ALBUTEROL SULFATE 90 UG/1
2 INHALANT RESPIRATORY (INHALATION) EVERY 4 HOURS PRN
Qty: 18 G | Refills: 1 | Status: SHIPPED | OUTPATIENT
Start: 2025-02-06

## 2025-02-06 RX ORDER — CITALOPRAM 20 MG/1
20 TABLET, FILM COATED ORAL DAILY
Qty: 30 TABLET | Refills: 1 | Status: SHIPPED | OUTPATIENT
Start: 2025-02-06 | End: 2025-04-07

## 2025-02-06 RX ORDER — AMOXICILLIN AND CLAVULANATE POTASSIUM 875; 125 MG/1; MG/1
875 TABLET, FILM COATED ORAL 2 TIMES DAILY
Qty: 20 TABLET | Refills: 0 | Status: SHIPPED | OUTPATIENT
Start: 2025-02-06 | End: 2025-02-16

## 2025-02-06 RX ORDER — FLUTICASONE PROPIONATE 50 MCG
2 SPRAY, SUSPENSION (ML) NASAL NIGHTLY
Qty: 16 G | Refills: 1 | Status: SHIPPED | OUTPATIENT
Start: 2025-02-06

## 2025-02-06 NOTE — PROGRESS NOTES
FAMILY MEDICINE  OFFICE VISIT   Arcelia Kaba  [id number]  1975    PCP: Chinyere Gutierrez DO     Chief Complaint:   Chief Complaint   Patient presents with    Follow-up     Pt presents for 4-6 week follow up menopause, HTN- Pt states that she is still tired, only sleeping 4 hours at night, lethargic, her sxs have not changed much since last visit.     SUBJECTIVE     Arcelia Kaba is a 49 y.o. English-speaking female with pertinent PMHx of HTN, s/p hysterectomy, who presents to the clinic with complaints of follow-up.    Perimenopause  - So tired all day long   - Can't sleep at night.   - Some night sweats   - Some discharge and cramping around time when cycle would be there, but no bleeding.   - Trying to lose weight.   - Good weight is around 180lbs   - Had the worst ANNA before, so that is better.   - Fall asleep at 930-10pm, sleep until 2am.   - Taking magnesium to help with sleep, sometimes sleeps until 5am.     HTN   - No longer having the headaches.   - Mom and dad are on top of her all the time to check blood pressure   - Dad has BP issues   - Snores at night.   - If on back will wake up at night.   - Daytime and afternoon somnolence   - Not well rested.   - Tired.     Bloating   - Still the same, no worse   - Labs normal  - Ultraound results discussed     2 kiddos are with her today.       The following portions of the patient's chart were reviewed in this encounter and updated as appropriate:  Tobacco  Allergies  Meds  Problems  Med Hx  Surg Hx  Fam Hx         Home Medication List:  Current Outpatient Medications   Medication Instructions    albuterol 90 mcg/actuation inhaler 2 puffs, Every 4 hours PRN    citalopram (CELEXA) 10 mg, oral, Daily    fluticasone (Flonase) 50 mcg/actuation nasal spray 1 spray, Daily    icosapent ethyL (VASCEPA) 2 g, oral, 2 times daily (morning and late afternoon)    lisinopril 20 mg, oral, Nightly    rosuvastatin (CRESTOR) 10 mg, oral, Daily         OBJECTIVE   BP  117/75 (BP Location: Left arm, Patient Position: Sitting, BP Cuff Size: Large adult)   Pulse 60   Temp 36.5 °C (97.7 °F) (Temporal)   Wt 102 kg (225 lb 9.6 oz)   SpO2 97%   BMI 32.84 kg/m²   Vital signs and pulse oximetry reviewed.     Physical Exam  Vitals and nursing note reviewed.   Constitutional:       Appearance: Normal appearance.   HENT:      Head: Normocephalic and atraumatic.      Right Ear: External ear normal. Tympanic membrane is erythematous and bulging. Tympanic membrane is not perforated.      Left Ear: External ear normal. Tympanic membrane is erythematous and bulging. Tympanic membrane is not perforated.      Ears:      Comments: RIGHT worse > LEFT      Nose: Nose normal. No congestion or rhinorrhea.   Eyes:      General: No scleral icterus.     Conjunctiva/sclera: Conjunctivae normal.   Cardiovascular:      Rate and Rhythm: Normal rate and regular rhythm.      Heart sounds: No murmur heard.  Pulmonary:      Effort: Pulmonary effort is normal. No respiratory distress.      Breath sounds: Normal breath sounds. No wheezing, rhonchi or rales.   Abdominal:      General: Bowel sounds are normal. There is no distension.      Palpations: Abdomen is soft. There is no fluid wave.      Tenderness: There is no abdominal tenderness. There is no guarding or rebound.      Hernia: No hernia is present.      Comments: Fullness in lower abdomen, which may be fascial restriction from prior surgeries.    Musculoskeletal:      Right lower leg: No edema.      Left lower leg: No edema.   Skin:     General: Skin is warm.      Coloration: Skin is not jaundiced.   Neurological:      Mental Status: She is alert. Mental status is at baseline.   Psychiatric:         Mood and Affect: Mood normal.         Behavior: Behavior normal.         ASSESSMENT & PLAN     Problem List Items Addressed This Visit       HTN (hypertension)    Overview     - Prior treatment:   - Hydrochlorothiazide (6/2022 - 7/2023)  - Amlodipine 2.5mg  (d/t edema)   - Current regimen: Lisinopril 20mg every day         Current Assessment & Plan     Patient blood pressure is controlled today with 117/75.  She is no longer having symptoms of her blood pressure like she was having before.  No longer having any headaches.  She does report snoring at night and will wake herself up in the middle of the night from her snoring, does not feel well rested in the morning, and is tired throughout the day.  She has daytime and afternoon somnolence.  -For these reasons we will get a home sleep study.  -We will also continue her lisinopril 20 mg nightly.         Relevant Orders    Home sleep apnea test (HSAT)    Follow Up In [facility] - PCP - Established    Hot flashes - Primary    Current Assessment & Plan     Patient has been experiencing perimenopausal symptoms recently, she is status post hysterectomy.  Her night sweats are improving, and she does report her body odor is also improving, so she is not having as many hot flashes.  She does still report having some issues with insomnia.  She was started on Celexa 10 mg at her last appointment, and lab work was obtained.  -We discussed these lab results today.  -We discussed her ultrasound also today.  -At this time we will increase her Celexa to 20 mg daily.  Rx sent.  -I will see patient again in 1 month to follow-up her symptoms.         Relevant Medications    citalopram (CeleXA) 20 mg tablet    Other Relevant Orders    Follow Up In [facility] - PCP - Established    Bilateral acute otitis media    Current Assessment & Plan     Patient with evidence of AOM in bilateral ears without rupture.   - Will treat with first-line therapy of Augmentin BID x 10 days.   - Start Flonase every day.   - Will send albuterol inhaler for cough and tightness on lung exam. Patient instructed how to use inhaler today and demonstrated in office. Advised to schedule q4h x 2 days, then space to q4h PRN after that.   - Patient  advised they can continue Tylenol for fever and discomfort.   - Discussed return precautions with patient, if they are not improving within 2-3 days.         Relevant Medications    amoxicillin-pot clavulanate (Augmentin) 875-125 mg tablet    fluticasone (Flonase) 50 mcg/actuation nasal spray     Other Visit Diagnoses       Shortness of breath        Relevant Medications    albuterol 90 mcg/actuation inhaler    Snoring        Relevant Orders    Home sleep apnea test (HSAT)    Daytime somnolence        Relevant Orders    Home sleep apnea test (HSAT)            Lvel 4    Follow-Up Recommendations: 1mo for perimeno    Please excuse any typos or grammatical errors, part of this note was constructed with Dragon dictation software.    Chinyere Gutierrez DO, MSEd  [facility]   Office: [phone]  2/6/2025 5:40 PM

## 2025-02-13 DIAGNOSIS — E78.5 HYPERLIPIDEMIA, UNSPECIFIED HYPERLIPIDEMIA TYPE: ICD-10-CM

## 2025-02-15 RX ORDER — ROSUVASTATIN CALCIUM 10 MG/1
10 TABLET, COATED ORAL DAILY
Qty: 90 TABLET | Refills: 1 | Status: SHIPPED | OUTPATIENT
Start: 2025-02-15

## 2025-02-15 NOTE — TELEPHONE ENCOUNTER
BRIEF NOTE    Subjective/Objective:  Pharmacy refill request for CRESTOR.     Assessment/Plan:  1. Hyperlipidemia, unspecified hyperlipidemia type  - rosuvastatin (Crestor) 10 mg tablet; Take 1 tablet (10 mg) by mouth once daily.  Dispense: 90 tablet; Refill: 1    No problem-specific Assessment & Plan notes found for this encounter.        Chinyere Gutierrez DO, MSEd  [facility]  Office: [phone]  2/15/2025 11:29 AM

## 2025-02-16 PROBLEM — H66.93 BILATERAL ACUTE OTITIS MEDIA: Status: ACTIVE | Noted: 2025-02-16

## 2025-02-16 NOTE — ASSESSMENT & PLAN NOTE
Patient has been experiencing perimenopausal symptoms recently, she is status post hysterectomy.  Her night sweats are improving, and she does report her body odor is also improving, so she is not having as many hot flashes.  She does still report having some issues with insomnia.  She was started on Celexa 10 mg at her last appointment, and lab work was obtained.  -We discussed these lab results today.  -We discussed her ultrasound also today.  -At this time we will increase her Celexa to 20 mg daily.  Rx sent.  -I will see patient again in 1 month to follow-up her symptoms.

## 2025-02-16 NOTE — ASSESSMENT & PLAN NOTE
Patient with evidence of AOM in bilateral ears without rupture.   - Will treat with first-line therapy of Augmentin BID x 10 days.   - Start Flonase every day.   - Will send albuterol inhaler for cough and tightness on lung exam. Patient instructed how to use inhaler today and demonstrated in office. Advised to schedule q4h x 2 days, then space to q4h PRN after that.   - Patient advised they can continue Tylenol for fever and discomfort.   - Discussed return precautions with patient, if they are not improving within 2-3 days.

## 2025-02-16 NOTE — ASSESSMENT & PLAN NOTE
Patient blood pressure is controlled today with 117/75.  She is no longer having symptoms of her blood pressure like she was having before.  No longer having any headaches.  She does report snoring at night and will wake herself up in the middle of the night from her snoring, does not feel well rested in the morning, and is tired throughout the day.  She has daytime and afternoon somnolence.  -For these reasons we will get a home sleep study.  -We will also continue her lisinopril 20 mg nightly.

## 2025-03-25 ENCOUNTER — APPOINTMENT (OUTPATIENT)
Dept: PRIMARY CARE | Facility: CLINIC | Age: 50
End: 2025-03-25
Payer: COMMERCIAL

## 2025-04-07 ENCOUNTER — APPOINTMENT (OUTPATIENT)
Dept: PRIMARY CARE | Facility: CLINIC | Age: 50
End: 2025-04-07
Payer: COMMERCIAL

## 2025-08-05 DIAGNOSIS — Z12.31 ENCOUNTER FOR SCREENING MAMMOGRAM FOR BREAST CANCER: ICD-10-CM
